# Patient Record
Sex: FEMALE | Race: WHITE | NOT HISPANIC OR LATINO | Employment: OTHER | URBAN - METROPOLITAN AREA
[De-identification: names, ages, dates, MRNs, and addresses within clinical notes are randomized per-mention and may not be internally consistent; named-entity substitution may affect disease eponyms.]

---

## 2017-01-12 ENCOUNTER — OFFICE VISIT - HEALTHEAST (OUTPATIENT)
Dept: FAMILY MEDICINE | Facility: CLINIC | Age: 62
End: 2017-01-12

## 2017-01-12 ENCOUNTER — RECORDS - HEALTHEAST (OUTPATIENT)
Dept: GENERAL RADIOLOGY | Facility: CLINIC | Age: 62
End: 2017-01-12

## 2017-01-12 DIAGNOSIS — R05.9 COUGH: ICD-10-CM

## 2017-01-12 DIAGNOSIS — H00.019 STYE: ICD-10-CM

## 2017-01-12 ASSESSMENT — MIFFLIN-ST. JEOR: SCORE: 1191.32

## 2017-01-23 ENCOUNTER — AMBULATORY - HEALTHEAST (OUTPATIENT)
Dept: FAMILY MEDICINE | Facility: CLINIC | Age: 62
End: 2017-01-23

## 2017-01-23 ENCOUNTER — COMMUNICATION - HEALTHEAST (OUTPATIENT)
Dept: FAMILY MEDICINE | Facility: CLINIC | Age: 62
End: 2017-01-23

## 2017-01-23 DIAGNOSIS — M81.0 OSTEOPOROSIS: ICD-10-CM

## 2017-01-23 DIAGNOSIS — Z12.39 SCREENING FOR BREAST CANCER: ICD-10-CM

## 2017-01-23 DIAGNOSIS — Z78.0 MENOPAUSE: ICD-10-CM

## 2017-01-23 DIAGNOSIS — Z00.00 HEALTH CARE MAINTENANCE: ICD-10-CM

## 2017-01-30 ENCOUNTER — AMBULATORY - HEALTHEAST (OUTPATIENT)
Dept: FAMILY MEDICINE | Facility: CLINIC | Age: 62
End: 2017-01-30

## 2017-01-30 ENCOUNTER — COMMUNICATION - HEALTHEAST (OUTPATIENT)
Dept: FAMILY MEDICINE | Facility: CLINIC | Age: 62
End: 2017-01-30

## 2017-02-06 ENCOUNTER — OFFICE VISIT - HEALTHEAST (OUTPATIENT)
Dept: FAMILY MEDICINE | Facility: CLINIC | Age: 62
End: 2017-02-06

## 2017-02-06 ENCOUNTER — RECORDS - HEALTHEAST (OUTPATIENT)
Dept: GENERAL RADIOLOGY | Facility: CLINIC | Age: 62
End: 2017-02-06

## 2017-02-06 DIAGNOSIS — R07.81 PLEURODYNIA: ICD-10-CM

## 2017-02-06 DIAGNOSIS — R07.81 RIB PAIN ON LEFT SIDE: ICD-10-CM

## 2017-02-06 ASSESSMENT — MIFFLIN-ST. JEOR: SCORE: 1195.86

## 2017-02-15 ENCOUNTER — RECORDS - HEALTHEAST (OUTPATIENT)
Dept: ADMINISTRATIVE | Facility: OTHER | Age: 62
End: 2017-02-15

## 2017-03-01 ENCOUNTER — RECORDS - HEALTHEAST (OUTPATIENT)
Dept: ADMINISTRATIVE | Facility: OTHER | Age: 62
End: 2017-03-01

## 2017-03-07 ENCOUNTER — RECORDS - HEALTHEAST (OUTPATIENT)
Dept: BONE DENSITY | Facility: CLINIC | Age: 62
End: 2017-03-07

## 2017-03-07 ENCOUNTER — RECORDS - HEALTHEAST (OUTPATIENT)
Dept: MAMMOGRAPHY | Facility: CLINIC | Age: 62
End: 2017-03-07

## 2017-03-07 DIAGNOSIS — Z78.0 ASYMPTOMATIC MENOPAUSAL STATE: ICD-10-CM

## 2017-03-07 DIAGNOSIS — Z00.00 ENCOUNTER FOR GENERAL ADULT MEDICAL EXAMINATION WITHOUT ABNORMAL FINDINGS: ICD-10-CM

## 2017-03-15 ENCOUNTER — AMBULATORY - HEALTHEAST (OUTPATIENT)
Dept: FAMILY MEDICINE | Facility: CLINIC | Age: 62
End: 2017-03-15

## 2017-04-24 ENCOUNTER — RECORDS - HEALTHEAST (OUTPATIENT)
Dept: GENERAL RADIOLOGY | Facility: CLINIC | Age: 62
End: 2017-04-24

## 2017-04-24 ENCOUNTER — OFFICE VISIT - HEALTHEAST (OUTPATIENT)
Dept: FAMILY MEDICINE | Facility: CLINIC | Age: 62
End: 2017-04-24

## 2017-04-24 ENCOUNTER — AMBULATORY - HEALTHEAST (OUTPATIENT)
Dept: FAMILY MEDICINE | Facility: CLINIC | Age: 62
End: 2017-04-24

## 2017-04-24 DIAGNOSIS — Z13.220 SCREENING FOR HYPERLIPIDEMIA: ICD-10-CM

## 2017-04-24 DIAGNOSIS — Z11.59 NEED FOR HEPATITIS C SCREENING TEST: ICD-10-CM

## 2017-04-24 DIAGNOSIS — Z00.00 ROUTINE GENERAL MEDICAL EXAMINATION AT A HEALTH CARE FACILITY: ICD-10-CM

## 2017-04-24 DIAGNOSIS — Z13.228 SCREENING FOR METABOLIC DISORDER: ICD-10-CM

## 2017-04-24 DIAGNOSIS — M43.6 NECK STIFFNESS: ICD-10-CM

## 2017-04-24 DIAGNOSIS — M43.6 TORTICOLLIS: ICD-10-CM

## 2017-04-24 DIAGNOSIS — Z12.72 SCREENING FOR VAGINAL CANCER: ICD-10-CM

## 2017-04-24 DIAGNOSIS — Z13.0 SCREENING FOR DEFICIENCY ANEMIA: ICD-10-CM

## 2017-04-24 LAB
CHOLEST SERPL-MCNC: 218 MG/DL
FASTING STATUS PATIENT QL REPORTED: YES
HDLC SERPL-MCNC: 70 MG/DL
LDLC SERPL CALC-MCNC: 132 MG/DL
TRIGL SERPL-MCNC: 81 MG/DL

## 2017-04-24 RX ORDER — INDAPAMIDE 1.25 MG
TABLET ORAL
Refills: 11 | Status: SHIPPED | COMMUNITY
Start: 2017-02-23

## 2017-04-24 ASSESSMENT — MIFFLIN-ST. JEOR: SCORE: 1173.18

## 2017-04-25 LAB — HCV AB SERPL QL IA: NEGATIVE

## 2017-04-26 ENCOUNTER — OFFICE VISIT - HEALTHEAST (OUTPATIENT)
Dept: PHYSICAL THERAPY | Facility: REHABILITATION | Age: 62
End: 2017-04-26

## 2017-04-26 DIAGNOSIS — G89.29 CHRONIC NECK PAIN: ICD-10-CM

## 2017-04-26 DIAGNOSIS — M54.2 CHRONIC NECK PAIN: ICD-10-CM

## 2017-04-26 DIAGNOSIS — R29.898 DECREASED ROM OF NECK: ICD-10-CM

## 2017-04-26 DIAGNOSIS — R29.3 ABNORMAL POSTURE: ICD-10-CM

## 2017-04-28 ENCOUNTER — AMBULATORY - HEALTHEAST (OUTPATIENT)
Dept: FAMILY MEDICINE | Facility: CLINIC | Age: 62
End: 2017-04-28

## 2017-04-28 ENCOUNTER — COMMUNICATION - HEALTHEAST (OUTPATIENT)
Dept: FAMILY MEDICINE | Facility: CLINIC | Age: 62
End: 2017-04-28

## 2017-04-28 LAB
BKR LAB AP ABNORMAL BLEEDING: NO
BKR LAB AP BIRTH CONTROL/HORMONES: NORMAL
BKR LAB AP CERVICAL APPEARANCE: NORMAL
BKR LAB AP GYN ADEQUACY: NORMAL
BKR LAB AP GYN INTERPRETATION: NORMAL
BKR LAB AP GYN OTHER FINDINGS: NORMAL
BKR LAB AP HPV REFLEX: NORMAL
BKR LAB AP LMP: NORMAL
BKR LAB AP PATIENT STATUS: NORMAL
BKR LAB AP PREVIOUS ABNORMAL: NORMAL
BKR LAB AP PREVIOUS NORMAL: 2014
HIGH RISK?: NO
HPV INTERPRETATION - HISTORICAL: NORMAL
HPV INTERPRETER - HISTORICAL: NORMAL
PATH REPORT.COMMENTS IMP SPEC: NORMAL
RESULT FLAG (HE HISTORICAL CONVERSION): NORMAL

## 2017-05-01 ENCOUNTER — COMMUNICATION - HEALTHEAST (OUTPATIENT)
Dept: FAMILY MEDICINE | Facility: CLINIC | Age: 62
End: 2017-05-01

## 2017-05-01 ENCOUNTER — AMBULATORY - HEALTHEAST (OUTPATIENT)
Dept: NURSING | Facility: CLINIC | Age: 62
End: 2017-05-01

## 2017-05-03 ENCOUNTER — OFFICE VISIT - HEALTHEAST (OUTPATIENT)
Dept: PHYSICAL THERAPY | Facility: REHABILITATION | Age: 62
End: 2017-05-03

## 2017-05-03 ENCOUNTER — OFFICE VISIT - HEALTHEAST (OUTPATIENT)
Dept: FAMILY MEDICINE | Facility: CLINIC | Age: 62
End: 2017-05-03

## 2017-05-03 DIAGNOSIS — G89.29 CHRONIC NECK PAIN: ICD-10-CM

## 2017-05-03 DIAGNOSIS — R29.3 ABNORMAL POSTURE: ICD-10-CM

## 2017-05-03 DIAGNOSIS — R29.898 DECREASED ROM OF NECK: ICD-10-CM

## 2017-05-03 DIAGNOSIS — Z12.4 CERVICAL CANCER SCREENING: ICD-10-CM

## 2017-05-03 DIAGNOSIS — M54.2 CHRONIC NECK PAIN: ICD-10-CM

## 2017-05-08 LAB
BKR LAB AP ABNORMAL BLEEDING: NO
BKR LAB AP BIRTH CONTROL/HORMONES: NORMAL
BKR LAB AP CERVICAL APPEARANCE: NORMAL
BKR LAB AP GYN ADEQUACY: NORMAL
BKR LAB AP GYN INTERPRETATION: NORMAL
BKR LAB AP HPV REFLEX: NORMAL
BKR LAB AP LMP: NORMAL
BKR LAB AP PATIENT STATUS: NORMAL
BKR LAB AP PREVIOUS ABNORMAL: NO
BKR LAB AP PREVIOUS NORMAL: 2013
HIGH RISK?: NO
HPV INTERPRETATION - HISTORICAL: NORMAL
HPV INTERPRETER - HISTORICAL: NORMAL
PATH REPORT.COMMENTS IMP SPEC: NORMAL
RESULT FLAG (HE HISTORICAL CONVERSION): NORMAL

## 2017-05-10 ENCOUNTER — OFFICE VISIT - HEALTHEAST (OUTPATIENT)
Dept: PHYSICAL THERAPY | Facility: REHABILITATION | Age: 62
End: 2017-05-10

## 2017-05-10 DIAGNOSIS — R29.3 ABNORMAL POSTURE: ICD-10-CM

## 2017-05-10 DIAGNOSIS — M54.2 CHRONIC NECK PAIN: ICD-10-CM

## 2017-05-10 DIAGNOSIS — R29.898 DECREASED ROM OF NECK: ICD-10-CM

## 2017-05-10 DIAGNOSIS — G89.29 CHRONIC NECK PAIN: ICD-10-CM

## 2017-07-05 ENCOUNTER — COMMUNICATION - HEALTHEAST (OUTPATIENT)
Dept: FAMILY MEDICINE | Facility: CLINIC | Age: 62
End: 2017-07-05

## 2017-07-07 ENCOUNTER — HOSPITAL ENCOUNTER (OUTPATIENT)
Dept: ULTRASOUND IMAGING | Facility: HOSPITAL | Age: 62
Discharge: HOME | End: 2017-07-07
Attending: FAMILY MEDICINE

## 2017-07-07 ENCOUNTER — OFFICE VISIT - HEALTHEAST (OUTPATIENT)
Dept: FAMILY MEDICINE | Facility: CLINIC | Age: 62
End: 2017-07-07

## 2017-07-07 DIAGNOSIS — I80.02 SUPERFICIAL PHLEBITIS OF LEFT LEG: ICD-10-CM

## 2017-07-07 ASSESSMENT — MIFFLIN-ST. JEOR: SCORE: 1159.57

## 2017-10-04 ENCOUNTER — AMBULATORY - HEALTHEAST (OUTPATIENT)
Dept: NURSING | Facility: CLINIC | Age: 62
End: 2017-10-04

## 2017-10-04 DIAGNOSIS — Z23 FLU VACCINE NEED: ICD-10-CM

## 2017-12-31 ENCOUNTER — HEALTH MAINTENANCE LETTER (OUTPATIENT)
Age: 62
End: 2017-12-31

## 2018-03-09 ENCOUNTER — RECORDS - HEALTHEAST (OUTPATIENT)
Dept: MAMMOGRAPHY | Facility: CLINIC | Age: 63
End: 2018-03-09

## 2018-03-09 DIAGNOSIS — Z12.31 ENCOUNTER FOR SCREENING MAMMOGRAM FOR MALIGNANT NEOPLASM OF BREAST: ICD-10-CM

## 2018-05-30 ENCOUNTER — OFFICE VISIT - HEALTHEAST (OUTPATIENT)
Dept: FAMILY MEDICINE | Facility: CLINIC | Age: 63
End: 2018-05-30

## 2018-05-30 DIAGNOSIS — Z80.41 FAMILY HISTORY OF OVARIAN CANCER: ICD-10-CM

## 2018-05-30 DIAGNOSIS — Z13.1 SCREENING FOR DIABETES MELLITUS: ICD-10-CM

## 2018-05-30 DIAGNOSIS — Z13.0 SCREENING FOR DEFICIENCY ANEMIA: ICD-10-CM

## 2018-05-30 DIAGNOSIS — Z00.00 ROUTINE GENERAL MEDICAL EXAMINATION AT A HEALTH CARE FACILITY: ICD-10-CM

## 2018-05-30 DIAGNOSIS — I83.93 VARICOSE VEINS OF BOTH LOWER EXTREMITIES: ICD-10-CM

## 2018-05-30 DIAGNOSIS — Z85.828 HISTORY OF BASAL CELL CANCER: ICD-10-CM

## 2018-05-30 DIAGNOSIS — M85.80 OSTEOPENIA: ICD-10-CM

## 2018-05-30 DIAGNOSIS — Z13.220 SCREENING FOR CHOLESTEROL LEVEL: ICD-10-CM

## 2018-05-30 DIAGNOSIS — Z13.29 SCREENING FOR THYROID DISORDER: ICD-10-CM

## 2018-05-30 LAB
CHOLEST SERPL-MCNC: 224 MG/DL
FASTING STATUS PATIENT QL REPORTED: YES
FASTING STATUS PATIENT QL REPORTED: YES
GLUCOSE BLD-MCNC: 86 MG/DL (ref 70–99)
HDLC SERPL-MCNC: 80 MG/DL
HGB BLD-MCNC: 13.2 G/DL (ref 12–16)
LDLC SERPL CALC-MCNC: 125 MG/DL
TRIGL SERPL-MCNC: 93 MG/DL
TSH SERPL DL<=0.005 MIU/L-ACNC: 1.71 UIU/ML (ref 0.3–5)

## 2018-05-30 ASSESSMENT — MIFFLIN-ST. JEOR: SCORE: 1110.81

## 2018-05-31 LAB
25(OH)D3 SERPL-MCNC: 52.7 NG/ML (ref 30–80)
25(OH)D3 SERPL-MCNC: 52.7 NG/ML (ref 30–80)

## 2018-09-26 ENCOUNTER — COMMUNICATION - HEALTHEAST (OUTPATIENT)
Dept: FAMILY MEDICINE | Facility: CLINIC | Age: 63
End: 2018-09-26

## 2018-09-28 ENCOUNTER — RECORDS - HEALTHEAST (OUTPATIENT)
Dept: GENERAL RADIOLOGY | Facility: CLINIC | Age: 63
End: 2018-09-28

## 2018-09-28 ENCOUNTER — OFFICE VISIT - HEALTHEAST (OUTPATIENT)
Dept: FAMILY MEDICINE | Facility: CLINIC | Age: 63
End: 2018-09-28

## 2018-09-28 DIAGNOSIS — M79.604 PAIN IN RIGHT LEG: ICD-10-CM

## 2018-09-28 DIAGNOSIS — M79.604 RIGHT LEG PAIN: ICD-10-CM

## 2018-10-01 ENCOUNTER — OFFICE VISIT - HEALTHEAST (OUTPATIENT)
Dept: PHYSICAL THERAPY | Facility: REHABILITATION | Age: 63
End: 2018-10-01

## 2018-10-01 DIAGNOSIS — M79.604 RIGHT LEG PAIN: ICD-10-CM

## 2018-10-01 DIAGNOSIS — M85.80 OSTEOPENIA: ICD-10-CM

## 2018-10-01 DIAGNOSIS — M54.41 RIGHT-SIDED LOW BACK PAIN WITH RIGHT-SIDED SCIATICA, UNSPECIFIED CHRONICITY: ICD-10-CM

## 2018-10-04 ENCOUNTER — OFFICE VISIT - HEALTHEAST (OUTPATIENT)
Dept: PHYSICAL THERAPY | Facility: REHABILITATION | Age: 63
End: 2018-10-04

## 2018-10-04 DIAGNOSIS — M54.41 RIGHT-SIDED LOW BACK PAIN WITH RIGHT-SIDED SCIATICA, UNSPECIFIED CHRONICITY: ICD-10-CM

## 2018-10-04 DIAGNOSIS — R29.898 DECREASED ROM OF NECK: ICD-10-CM

## 2018-10-04 DIAGNOSIS — M54.2 CHRONIC NECK PAIN: ICD-10-CM

## 2018-10-04 DIAGNOSIS — R29.3 ABNORMAL POSTURE: ICD-10-CM

## 2018-10-04 DIAGNOSIS — M79.604 RIGHT LEG PAIN: ICD-10-CM

## 2018-10-04 DIAGNOSIS — G89.29 CHRONIC NECK PAIN: ICD-10-CM

## 2018-10-04 DIAGNOSIS — M85.80 OSTEOPENIA: ICD-10-CM

## 2018-10-09 ENCOUNTER — OFFICE VISIT - HEALTHEAST (OUTPATIENT)
Dept: PHYSICAL THERAPY | Facility: REHABILITATION | Age: 63
End: 2018-10-09

## 2018-10-09 DIAGNOSIS — M85.80 OSTEOPENIA: ICD-10-CM

## 2018-10-09 DIAGNOSIS — M54.41 RIGHT-SIDED LOW BACK PAIN WITH RIGHT-SIDED SCIATICA, UNSPECIFIED CHRONICITY: ICD-10-CM

## 2018-10-09 DIAGNOSIS — M79.604 RIGHT LEG PAIN: ICD-10-CM

## 2018-10-12 ENCOUNTER — OFFICE VISIT - HEALTHEAST (OUTPATIENT)
Dept: PHYSICAL THERAPY | Facility: REHABILITATION | Age: 63
End: 2018-10-12

## 2018-10-12 DIAGNOSIS — M54.41 RIGHT-SIDED LOW BACK PAIN WITH RIGHT-SIDED SCIATICA, UNSPECIFIED CHRONICITY: ICD-10-CM

## 2018-10-12 DIAGNOSIS — M85.80 OSTEOPENIA: ICD-10-CM

## 2018-10-12 DIAGNOSIS — M79.604 RIGHT LEG PAIN: ICD-10-CM

## 2018-10-17 ENCOUNTER — OFFICE VISIT - HEALTHEAST (OUTPATIENT)
Dept: PHYSICAL THERAPY | Facility: REHABILITATION | Age: 63
End: 2018-10-17

## 2018-10-17 DIAGNOSIS — M85.80 OSTEOPENIA: ICD-10-CM

## 2018-10-17 DIAGNOSIS — M79.604 RIGHT LEG PAIN: ICD-10-CM

## 2018-10-17 DIAGNOSIS — M54.41 RIGHT-SIDED LOW BACK PAIN WITH RIGHT-SIDED SCIATICA, UNSPECIFIED CHRONICITY: ICD-10-CM

## 2018-10-17 DIAGNOSIS — G89.29 CHRONIC NECK PAIN: ICD-10-CM

## 2018-10-17 DIAGNOSIS — R29.3 ABNORMAL POSTURE: ICD-10-CM

## 2018-10-17 DIAGNOSIS — M54.2 CHRONIC NECK PAIN: ICD-10-CM

## 2018-10-17 DIAGNOSIS — R29.898 DECREASED ROM OF NECK: ICD-10-CM

## 2018-10-22 ENCOUNTER — OFFICE VISIT - HEALTHEAST (OUTPATIENT)
Dept: PHYSICAL THERAPY | Facility: REHABILITATION | Age: 63
End: 2018-10-22

## 2018-10-22 DIAGNOSIS — M79.604 RIGHT LEG PAIN: ICD-10-CM

## 2018-10-22 DIAGNOSIS — M54.41 RIGHT-SIDED LOW BACK PAIN WITH RIGHT-SIDED SCIATICA, UNSPECIFIED CHRONICITY: ICD-10-CM

## 2018-10-22 DIAGNOSIS — M85.80 OSTEOPENIA: ICD-10-CM

## 2018-10-29 ENCOUNTER — OFFICE VISIT - HEALTHEAST (OUTPATIENT)
Dept: PHYSICAL THERAPY | Facility: REHABILITATION | Age: 63
End: 2018-10-29

## 2018-10-29 DIAGNOSIS — M79.604 RIGHT LEG PAIN: ICD-10-CM

## 2018-10-29 DIAGNOSIS — M85.80 OSTEOPENIA: ICD-10-CM

## 2018-10-29 DIAGNOSIS — M54.41 RIGHT-SIDED LOW BACK PAIN WITH RIGHT-SIDED SCIATICA, UNSPECIFIED CHRONICITY: ICD-10-CM

## 2019-01-08 ENCOUNTER — AMBULATORY - HEALTHEAST (OUTPATIENT)
Dept: NURSING | Facility: CLINIC | Age: 64
End: 2019-01-08

## 2019-01-08 DIAGNOSIS — Z00.00 ROUTINE GENERAL MEDICAL EXAMINATION AT A HEALTH CARE FACILITY: ICD-10-CM

## 2019-02-18 ENCOUNTER — AMBULATORY - HEALTHEAST (OUTPATIENT)
Dept: NURSING | Facility: CLINIC | Age: 64
End: 2019-02-18

## 2019-03-13 ENCOUNTER — RECORDS - HEALTHEAST (OUTPATIENT)
Dept: ADMINISTRATIVE | Facility: OTHER | Age: 64
End: 2019-03-13

## 2019-03-21 ENCOUNTER — RECORDS - HEALTHEAST (OUTPATIENT)
Dept: ADMINISTRATIVE | Facility: OTHER | Age: 64
End: 2019-03-21

## 2019-04-02 ENCOUNTER — COMMUNICATION - HEALTHEAST (OUTPATIENT)
Dept: FAMILY MEDICINE | Facility: CLINIC | Age: 64
End: 2019-04-02

## 2019-04-02 DIAGNOSIS — M85.89 OSTEOPENIA OF MULTIPLE SITES: ICD-10-CM

## 2019-04-16 ENCOUNTER — RECORDS - HEALTHEAST (OUTPATIENT)
Dept: BONE DENSITY | Facility: CLINIC | Age: 64
End: 2019-04-16

## 2019-04-16 ENCOUNTER — RECORDS - HEALTHEAST (OUTPATIENT)
Dept: ADMINISTRATIVE | Facility: OTHER | Age: 64
End: 2019-04-16

## 2019-04-16 ENCOUNTER — RECORDS - HEALTHEAST (OUTPATIENT)
Dept: MAMMOGRAPHY | Facility: CLINIC | Age: 64
End: 2019-04-16

## 2019-04-16 DIAGNOSIS — Z12.31 ENCOUNTER FOR SCREENING MAMMOGRAM FOR MALIGNANT NEOPLASM OF BREAST: ICD-10-CM

## 2019-04-16 DIAGNOSIS — M85.89 OTHER SPECIFIED DISORDERS OF BONE DENSITY AND STRUCTURE, MULTIPLE SITES: ICD-10-CM

## 2019-05-08 ENCOUNTER — AMBULATORY - HEALTHEAST (OUTPATIENT)
Dept: NURSING | Facility: CLINIC | Age: 64
End: 2019-05-08

## 2019-05-18 ENCOUNTER — COMMUNICATION - HEALTHEAST (OUTPATIENT)
Dept: FAMILY MEDICINE | Facility: CLINIC | Age: 64
End: 2019-05-18

## 2019-05-18 DIAGNOSIS — Z12.11 SCREENING FOR COLON CANCER: ICD-10-CM

## 2019-05-28 ENCOUNTER — RECORDS - HEALTHEAST (OUTPATIENT)
Dept: ADMINISTRATIVE | Facility: OTHER | Age: 64
End: 2019-05-28

## 2019-06-03 ENCOUNTER — OFFICE VISIT - HEALTHEAST (OUTPATIENT)
Dept: FAMILY MEDICINE | Facility: CLINIC | Age: 64
End: 2019-06-03

## 2019-06-03 DIAGNOSIS — Z13.0 SCREENING FOR DEFICIENCY ANEMIA: ICD-10-CM

## 2019-06-03 DIAGNOSIS — Z85.828 HISTORY OF SKIN CANCER: ICD-10-CM

## 2019-06-03 DIAGNOSIS — Z13.29 SCREENING FOR THYROID DISORDER: ICD-10-CM

## 2019-06-03 DIAGNOSIS — Z00.00 ROUTINE GENERAL MEDICAL EXAMINATION AT A HEALTH CARE FACILITY: ICD-10-CM

## 2019-06-03 DIAGNOSIS — Z13.21 ENCOUNTER FOR VITAMIN DEFICIENCY SCREENING: ICD-10-CM

## 2019-06-03 DIAGNOSIS — M81.0 AGE-RELATED OSTEOPOROSIS WITHOUT CURRENT PATHOLOGICAL FRACTURE: ICD-10-CM

## 2019-06-03 DIAGNOSIS — Z13.220 SCREENING FOR CHOLESTEROL LEVEL: ICD-10-CM

## 2019-06-03 DIAGNOSIS — Z13.1 SCREENING FOR DIABETES MELLITUS: ICD-10-CM

## 2019-06-03 LAB
ANION GAP SERPL CALCULATED.3IONS-SCNC: 9 MMOL/L (ref 5–18)
BUN SERPL-MCNC: 11 MG/DL (ref 8–22)
CALCIUM SERPL-MCNC: 10.2 MG/DL (ref 8.5–10.5)
CHLORIDE BLD-SCNC: 104 MMOL/L (ref 98–107)
CHOLEST SERPL-MCNC: 227 MG/DL
CO2 SERPL-SCNC: 27 MMOL/L (ref 22–31)
CREAT SERPL-MCNC: 0.73 MG/DL (ref 0.6–1.1)
ERYTHROCYTE [DISTWIDTH] IN BLOOD BY AUTOMATED COUNT: 11.8 % (ref 11–14.5)
FASTING STATUS PATIENT QL REPORTED: YES
GFR SERPL CREATININE-BSD FRML MDRD: >60 ML/MIN/1.73M2
GLUCOSE BLD-MCNC: 91 MG/DL (ref 70–125)
HCT VFR BLD AUTO: 42.3 % (ref 35–47)
HDLC SERPL-MCNC: 77 MG/DL
HGB BLD-MCNC: 14.1 G/DL (ref 12–16)
LDLC SERPL CALC-MCNC: 136 MG/DL
MCH RBC QN AUTO: 29.6 PG (ref 27–34)
MCHC RBC AUTO-ENTMCNC: 33.3 G/DL (ref 32–36)
MCV RBC AUTO: 89 FL (ref 80–100)
PLATELET # BLD AUTO: 237 THOU/UL (ref 140–440)
PMV BLD AUTO: 8.8 FL (ref 7–10)
POTASSIUM BLD-SCNC: 5.2 MMOL/L (ref 3.5–5)
RBC # BLD AUTO: 4.76 MILL/UL (ref 3.8–5.4)
SODIUM SERPL-SCNC: 140 MMOL/L (ref 136–145)
TRIGL SERPL-MCNC: 69 MG/DL
TSH SERPL DL<=0.005 MIU/L-ACNC: 2.43 UIU/ML (ref 0.3–5)
WBC: 4.9 THOU/UL (ref 4–11)

## 2019-06-03 ASSESSMENT — MIFFLIN-ST. JEOR: SCORE: 1130.09

## 2019-06-04 LAB
25(OH)D3 SERPL-MCNC: 49.5 NG/ML (ref 30–80)
25(OH)D3 SERPL-MCNC: 49.5 NG/ML (ref 30–80)

## 2019-06-11 ENCOUNTER — RECORDS - HEALTHEAST (OUTPATIENT)
Dept: ADMINISTRATIVE | Facility: OTHER | Age: 64
End: 2019-06-11

## 2019-06-17 ENCOUNTER — COMMUNICATION - HEALTHEAST (OUTPATIENT)
Dept: LAB | Facility: CLINIC | Age: 64
End: 2019-06-17

## 2019-06-17 DIAGNOSIS — E87.5 HYPERKALEMIA: ICD-10-CM

## 2019-06-18 ENCOUNTER — AMBULATORY - HEALTHEAST (OUTPATIENT)
Dept: LAB | Facility: CLINIC | Age: 64
End: 2019-06-18

## 2019-06-18 DIAGNOSIS — E87.5 HYPERKALEMIA: ICD-10-CM

## 2019-06-18 LAB — POTASSIUM BLD-SCNC: 4.8 MMOL/L (ref 3.5–5)

## 2019-10-14 ENCOUNTER — AMBULATORY - HEALTHEAST (OUTPATIENT)
Dept: NURSING | Facility: CLINIC | Age: 64
End: 2019-10-14

## 2019-10-14 DIAGNOSIS — Z23 FLU VACCINE NEED: ICD-10-CM

## 2020-07-01 ENCOUNTER — RECORDS - HEALTHEAST (OUTPATIENT)
Dept: ADMINISTRATIVE | Facility: OTHER | Age: 65
End: 2020-07-01

## 2020-10-01 ENCOUNTER — AMBULATORY - HEALTHEAST (OUTPATIENT)
Dept: FAMILY MEDICINE | Facility: CLINIC | Age: 65
End: 2020-10-01

## 2020-10-01 ENCOUNTER — OFFICE VISIT - HEALTHEAST (OUTPATIENT)
Dept: FAMILY MEDICINE | Facility: CLINIC | Age: 65
End: 2020-10-01

## 2020-10-01 DIAGNOSIS — Z13.0 SCREENING FOR DEFICIENCY ANEMIA: ICD-10-CM

## 2020-10-01 DIAGNOSIS — Z23 ENCOUNTER FOR IMMUNIZATION: ICD-10-CM

## 2020-10-01 DIAGNOSIS — Z00.00 ROUTINE GENERAL MEDICAL EXAMINATION AT A HEALTH CARE FACILITY: ICD-10-CM

## 2020-10-01 DIAGNOSIS — Z13.21 ENCOUNTER FOR VITAMIN DEFICIENCY SCREENING: ICD-10-CM

## 2020-10-01 DIAGNOSIS — Z12.31 VISIT FOR SCREENING MAMMOGRAM: ICD-10-CM

## 2020-10-01 DIAGNOSIS — S16.1XXA STRAIN OF CERVICAL PORTION OF LEFT TRAPEZIUS MUSCLE: ICD-10-CM

## 2020-10-01 DIAGNOSIS — Z13.220 SCREENING FOR CHOLESTEROL LEVEL: ICD-10-CM

## 2020-10-01 DIAGNOSIS — Z13.1 SCREENING FOR DIABETES MELLITUS: ICD-10-CM

## 2020-10-01 DIAGNOSIS — M81.0 AGE-RELATED OSTEOPOROSIS WITHOUT CURRENT PATHOLOGICAL FRACTURE: ICD-10-CM

## 2020-10-01 LAB
ANION GAP SERPL CALCULATED.3IONS-SCNC: 13 MMOL/L (ref 5–18)
BUN SERPL-MCNC: 11 MG/DL (ref 8–22)
CALCIUM SERPL-MCNC: 9.6 MG/DL (ref 8.5–10.5)
CHLORIDE BLD-SCNC: 101 MMOL/L (ref 98–107)
CHOLEST SERPL-MCNC: 219 MG/DL
CO2 SERPL-SCNC: 26 MMOL/L (ref 22–31)
CREAT SERPL-MCNC: 0.74 MG/DL (ref 0.6–1.1)
FASTING STATUS PATIENT QL REPORTED: YES
GFR SERPL CREATININE-BSD FRML MDRD: >60 ML/MIN/1.73M2
GLUCOSE BLD-MCNC: 86 MG/DL (ref 70–125)
HDLC SERPL-MCNC: 75 MG/DL
HGB BLD-MCNC: 13.3 G/DL (ref 12–16)
LDLC SERPL CALC-MCNC: 129 MG/DL
POTASSIUM BLD-SCNC: 4.6 MMOL/L (ref 3.5–5)
SODIUM SERPL-SCNC: 140 MMOL/L (ref 136–145)
TRIGL SERPL-MCNC: 77 MG/DL

## 2020-10-01 ASSESSMENT — MIFFLIN-ST. JEOR: SCORE: 1147.1

## 2020-10-02 LAB
25(OH)D3 SERPL-MCNC: 48.7 NG/ML (ref 30–80)
25(OH)D3 SERPL-MCNC: 48.7 NG/ML (ref 30–80)

## 2020-11-04 ENCOUNTER — COMMUNICATION - HEALTHEAST (OUTPATIENT)
Dept: FAMILY MEDICINE | Facility: CLINIC | Age: 65
End: 2020-11-04

## 2020-11-05 ENCOUNTER — HOSPITAL ENCOUNTER (OUTPATIENT)
Dept: MAMMOGRAPHY | Facility: CLINIC | Age: 65
Discharge: HOME | End: 2020-11-05
Attending: NURSE PRACTITIONER

## 2020-11-05 DIAGNOSIS — Z12.31 VISIT FOR SCREENING MAMMOGRAM: ICD-10-CM

## 2020-11-16 ENCOUNTER — COMMUNICATION - HEALTHEAST (OUTPATIENT)
Dept: FAMILY MEDICINE | Facility: CLINIC | Age: 65
End: 2020-11-16

## 2020-11-16 DIAGNOSIS — M81.0 AGE-RELATED OSTEOPOROSIS WITHOUT CURRENT PATHOLOGICAL FRACTURE: ICD-10-CM

## 2021-01-12 ENCOUNTER — OFFICE VISIT - HEALTHEAST (OUTPATIENT)
Dept: FAMILY MEDICINE | Facility: CLINIC | Age: 66
End: 2021-01-12

## 2021-01-12 DIAGNOSIS — I82.812 ACUTE SUPERFICIAL VENOUS THROMBOSIS OF LOWER EXTREMITY, LEFT: ICD-10-CM

## 2021-01-20 ENCOUNTER — RECORDS - HEALTHEAST (OUTPATIENT)
Dept: BONE DENSITY | Facility: CLINIC | Age: 66
End: 2021-01-20

## 2021-01-20 ENCOUNTER — RECORDS - HEALTHEAST (OUTPATIENT)
Dept: ADMINISTRATIVE | Facility: OTHER | Age: 66
End: 2021-01-20

## 2021-01-20 DIAGNOSIS — M81.0 AGE-RELATED OSTEOPOROSIS WITHOUT CURRENT PATHOLOGICAL FRACTURE: ICD-10-CM

## 2021-01-27 ENCOUNTER — COMMUNICATION - HEALTHEAST (OUTPATIENT)
Dept: FAMILY MEDICINE | Facility: CLINIC | Age: 66
End: 2021-01-27

## 2021-01-27 DIAGNOSIS — M81.0 AGE-RELATED OSTEOPOROSIS WITHOUT CURRENT PATHOLOGICAL FRACTURE: ICD-10-CM

## 2021-01-29 RX ORDER — ALENDRONATE SODIUM 70 MG/1
70 TABLET ORAL
Qty: 12 TABLET | Refills: 3 | Status: SHIPPED | OUTPATIENT
Start: 2021-01-29 | End: 2021-10-18

## 2021-03-09 ENCOUNTER — RECORDS - HEALTHEAST (OUTPATIENT)
Dept: ADMINISTRATIVE | Facility: OTHER | Age: 66
End: 2021-03-09

## 2021-05-28 ENCOUNTER — RECORDS - HEALTHEAST (OUTPATIENT)
Dept: ADMINISTRATIVE | Facility: CLINIC | Age: 66
End: 2021-05-28

## 2021-05-29 ENCOUNTER — RECORDS - HEALTHEAST (OUTPATIENT)
Dept: ADMINISTRATIVE | Facility: CLINIC | Age: 66
End: 2021-05-29

## 2021-05-29 NOTE — TELEPHONE ENCOUNTER
Hi Dr Edmond,  This patient is on lab schedule for tomorrow morning at 08 :15 AM and need some lab orders placed, please. Thank you.

## 2021-05-29 NOTE — PROGRESS NOTES
Assessment and Plan:   1. Routine general medical examination at a health care facility  Fasting labs, up to date on pap, mammography, colonoscopy, immunizations.  Plan for PPSV23 in one year. She has some mild neuropathy symptoms in her left lateral thigh intermittently. Likely pinched nerve, her strength and DTRs are normal on exam. Advised continued monitoring.     2. Age-related osteoporosis without current pathological fracture  Reviewed recent DEXA results as compared to previous and discussed current research to support drug holiday from bisphosphonates for 3-5 years.  She did have a significant decline in density of the AP spine of 6.1%.  Will plan to repeat DEXA in one year and if subsequent significant decline, will discuss restarting therapy. Continue with calcium, vitamin D and weight bearing exercise/strength training.   - Basic Metabolic Panel  - Vitamin D, Total (25-Hydroxy)    3. History of skin cancer  Follows with dermatology    4. Screening for cholesterol level  - Lipid Cascade    5. Screening for diabetes mellitus  - Basic Metabolic Panel    6. Screening for deficiency anemia  - HM2(CBC w/o Differential)    7. Encounter for vitamin deficiency screening  - Vitamin D, Total (25-Hydroxy)    8. Screening for thyroid disorder  - Thyroid Cascade    The patient's current medical problems were reviewed.    I have had an Advance Directives discussion with the patient.  The following health maintenance schedule was reviewed with the patient and provided in printed form in the after visit summary:   Health Maintenance   Topic Date Due     ADVANCE DIRECTIVES DISCUSSED WITH PATIENT  01/05/2012     PNEUMOCOCCAL POLYSACCHARIDE VACCINE AGE 65 AND OVER  03/31/2019     FALL RISK ASSESSMENT  03/31/2019     MAMMOGRAM  04/16/2021     DXA SCAN  04/16/2021     COLONOSCOPY  05/28/2024     TD 18+ HE  02/18/2029     INFLUENZA VACCINE RULE BASED  Completed     TDAP ADULT ONE TIME DOSE  Completed     PNEUMOCOCCAL  CONJUGATE VACCINE FOR ADULTS (PCV13 OR PREVNAR)  Completed     ZOSTER VACCINES  Completed        Subjective:   Chief Complaint: Sharyn Mcfarlane is an 64 y.o. female here for a Welcome to Medicare visit.   HPI:      H/o osteoporosis.  Treated with Fosamax x8 years and started drug holiday in 2017.  DEXA last month showed worsening bone mineral density with decrease of 6.1% in the AP spine and 3% in the right hip. T-score -2.7 in spine.   [facility] and [facility] training 5-6x/week.     Left thigh tingling intermittently.  Typically with sitting on cough or recliner. She has not noted any weakness.  Left side of back stiff at times though not particularly painful.  No h/o injury.     HM:  Mammography annually d/t [facility] ovarian cancer, up to date on this. Colonoscopy in May, normal screen this time but history of polyps, due in 2024. Pap last year with scant cellularity s/p hysterectomy, discussed discontinuing screening. DEXA as above.      Review of Systems:   Please see above.  The rest of the review of systems are negative for all systems.    Patient Care Team:  Vandana Warren CNP as PCP - General (Nurse Practitioner)     Patient Active Problem List   Diagnosis     Glaucoma     Rosacea     Osteopenia     History of basal cell cancer     Family history of ovarian cancer     Past Medical History:   Diagnosis Date     Carpal tunnel syndrome      Fibrocystic breast      Glaucoma       Past Surgical History:   Procedure Laterality Date     HYSTERECTOMY  1990     OOPHORECTOMY       MT LAP,DIAGNOSTIC ABDOMEN      Description: Laparoscopy (Diagnostic);  Recorded: 03/31/2011;     MT REMOVAL OF TONSILS,<13 Y/O      Description: Tonsillectomy;  Recorded: 01/16/2009;     MT TOTAL ABDOM HYSTERECTOMY      Description: Hysterectomy;  Recorded: 01/20/2010;  Comments: vaginal      Family History   Problem Relation Age of Onset     Ovarian cancer Mother 65     Cancer Mother          skin     Colon cancer Father 88     Cancer Father         parotid     Glaucoma Father      Heart disease Paternal Grandfather      Diabetes type II Maternal Uncle       Social History     Socioeconomic History     Marital status:      Spouse name: Not on file     Number of children: Not on file     Years of education: Not on file     Highest education level: Not on file   Occupational History     Not on file   Social Needs     Financial resource strain: Not on file     Food insecurity:     Worry: Not on file     Inability: Not on file     Transportation needs:     Medical: Not on file     Non-medical: Not on file   Tobacco Use     Smoking status: Former Smoker     Smokeless tobacco: Never Used   Substance and Sexual Activity     Alcohol use: Yes     Alcohol/week: 3.5 oz     Types: 7 Standard drinks or equivalent per week     Drug use: No     Sexual activity: Yes     Partners: Male     Birth control/protection: Post-menopausal   Lifestyle     Physical activity:     Days per week: Not on file     Minutes per session: Not on file     Stress: Not on file   Relationships     Social connections:     Talks on phone: Not on file     Gets together: Not on file     Attends Baptism service: Not on file     Active member of club or organization: Not on file     Attends meetings of clubs or organizations: Not on file     Relationship status: Not on file     Intimate partner violence:     Fear of current or ex partner: Not on file     Emotionally abused: Not on file     Physically abused: Not on file     Forced sexual activity: Not on file   Other Topics Concern     Not on file   Social History Narrative    ,  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        Current Outpatient Medications   Medication Sig Dispense Refill     calcium carbonate (OS-ARNIE) 600 mg (1,500 mg) tablet Take 600 mg by mouth 2 (two) times a day with meals.       latanoprost (XALATAN) 0.005 % ophthalmic solution        multivitamin  "capsule Take 1 capsule by mouth daily.       FINACEA 15 % gel   11     No current facility-administered medications for this visit.       Objective:   Vital Signs:   Visit Vitals  /68 (Patient Site: Right Arm, Patient Position: Sitting, Cuff Size: Adult Regular)   Pulse (!) 56   Ht 5' 4\" (1.626 m)   Wt 134 lb 8 oz (61 kg)   BMI 23.09 kg/m         EKG:  Not indicated    VisionScreening:   Visual Acuity Screening    Right eye Left eye Both eyes   Without correction: 20/25 20/20 20/20   With correction:           PHYSICAL EXAM  GENERAL: Alert, well appearing female  PSYCH: Pleasant mood, affect appropriate.   SKIN: No atypical lesions  EYES: Conjunctiva pink, sclera white, no exudates. OVIDIO.  EOMs intact.   EARS: TMs pearly grey, no bulging, redness, retraction. Hearing grossly normal.   MOUTH: Pharynx moist, pink without exudate. No tonsillar enlargement  NECK: No lymphadenopathy. Thyroid borders smooth without enlargement, nodules.   CV: Regular rate and rhythm without murmurs, rubs or gallops.  RESP: Lung sounds clear  ABDOMEN: BS+. Abdomen soft, nontender to palpation without guarding. No organomegaly, masses or hernias  PV : No edema  BREASTS: Breasts symmetric, no dimpling, masses or skin discolorations seen. Areolas and nipples symmetric without discharge. On palpation, breast tissue supple and nontender. No masses or nodules. Axillary and epitrochlear lymph nodes nonpalpable.   MSK:  LE strength 5/5 bilaterally  NEURO:  DTRs 2/4 patellar, post tibial      Assessment Results 6/3/2019   Activities of Daily Living No help needed   Instrumental Activities of Daily Living No help needed   Mini Cog Total Score 5   Some recent data might be hidden     A Mini Cog score of 0-2 suggests the possibility of dementia, score of 3-5 suggests no dementia    Identified Health Risks:     Information regarding advance directives (living [place]), including where she can download the appropriate form, was provided to the " patient via the AVS.

## 2021-05-30 VITALS — HEIGHT: 64 IN | BODY MASS INDEX: 25.27 KG/M2 | WEIGHT: 148 LBS

## 2021-05-30 VITALS — HEIGHT: 64 IN | BODY MASS INDEX: 24.59 KG/M2 | WEIGHT: 144 LBS

## 2021-05-30 VITALS — WEIGHT: 149 LBS | BODY MASS INDEX: 25.44 KG/M2 | HEIGHT: 64 IN

## 2021-05-31 VITALS — WEIGHT: 141 LBS | BODY MASS INDEX: 24.07 KG/M2 | HEIGHT: 64 IN

## 2021-06-01 VITALS — WEIGHT: 130.25 LBS | HEIGHT: 64 IN | BODY MASS INDEX: 22.24 KG/M2

## 2021-06-02 VITALS — BODY MASS INDEX: 23 KG/M2 | WEIGHT: 134 LBS

## 2021-06-03 VITALS — BODY MASS INDEX: 22.96 KG/M2 | WEIGHT: 134.5 LBS | HEIGHT: 64 IN

## 2021-06-05 VITALS
HEART RATE: 63 BPM | OXYGEN SATURATION: 97 % | BODY MASS INDEX: 24.26 KG/M2 | SYSTOLIC BLOOD PRESSURE: 126 MMHG | DIASTOLIC BLOOD PRESSURE: 78 MMHG | WEIGHT: 141.31 LBS

## 2021-06-05 VITALS
BODY MASS INDEX: 23.6 KG/M2 | TEMPERATURE: 98.3 F | WEIGHT: 138.25 LBS | DIASTOLIC BLOOD PRESSURE: 70 MMHG | SYSTOLIC BLOOD PRESSURE: 120 MMHG | RESPIRATION RATE: 16 BRPM | HEART RATE: 60 BPM | HEIGHT: 64 IN

## 2021-06-08 NOTE — PROGRESS NOTES
Assessment/Plan:        Diagnoses and all orders for this visit:    Rib pain on left side  -     XR Ribs Left W PA Chest; Future; Expected date: 2/6/17    Xray taken and read by me - this all appears normal withoiut any fx or dislocations   Would treat this as musculoskeletal pain - Advil or tylenol. Can try gentle heat to the area as well.         Subjective:    Patient ID: Sharyn Mcfarlane is a 61 y.o. female.    Abdominal Pain   This is a new problem. The current episode started in the past 7 days. The onset quality is gradual. The problem occurs intermittently. The problem has been unchanged. The pain is located in the LUQ and left flank. The pain is at a severity of 3/10. The pain is mild. The quality of the pain is aching, burning, cramping and sharp. The abdominal pain radiates to the back. Pertinent negatives include no belching, constipation, diarrhea, dysuria, fever, frequency, hematuria, nausea or vomiting. The pain is aggravated by certain positions, deep breathing and movement. The pain is relieved by being still. She has tried acetaminophen for the symptoms. The treatment provided no relief.       The following portions of the patient's history were reviewed and updated as appropriate: allergies, current medications, past family history, past medical history, past social history, past surgical history and problem list.    Review of Systems   Constitutional: Negative for activity change, appetite change, fatigue and fever.   Gastrointestinal: Positive for abdominal pain. Negative for abdominal distention, constipation, diarrhea, nausea and vomiting.   Genitourinary: Negative for dysuria, frequency and hematuria.   All other systems reviewed and are negative.            Objective:    Physical Exam   Constitutional: She appears well-developed and well-nourished. No distress.   Cardiovascular: Normal rate, regular rhythm and normal heart sounds.  Exam reveals no gallop and no friction rub.    No murmur  heard.  Pulmonary/Chest: Effort normal and breath sounds normal. She has no wheezes. She has no rales.   Abdominal: Soft. Bowel sounds are normal. She exhibits no distension and no mass. There is no guarding.   There is almost point tenderness over the L flank ribs. No bruising noted. No skin lesions noted. No CVA tenderness noted. The tenderness travels around the rib cage.    Skin: Skin is warm and dry. No rash noted.   Nursing note and vitals reviewed.

## 2021-06-08 NOTE — PROGRESS NOTES
Subjective   Chief Complaint:  Cough (since [place]. )    HPI:   Sharyn Mcfarlane is a 61 y.o. female who presents for evaluation of cough.   She is a patient of Dr. So.  PMH significant for glaucoma, rosacea.     She states she had a URI that began two and a half weeks ago and along with upper respiratory symptoms she felt tightness in her chest described as the need to work harder to take a deep breath.  Cough developed shortly after and has continued until this time.  Productive at times.  No fever, chills though feels markedly fatigued.  Difficulty getting the energy to do much.  Continues to feel she has to work harder to inhale.  No chest pain. Denies shortness of breath or wheezing.      Not a current smoker though former smoker 20 years ago      Left eye:  Lower lid with swelling and redness.  Initially improving with warm packs though then stopped using the and it has persisted.  Mildly tender.  No drainage.     PMH:   Patient Active Problem List   Diagnosis     Glaucoma     Rosacea     Osteoporosis       Past Medical History   Diagnosis Date     Carpal tunnel syndrome      Fibrocystic breast      Glaucoma        Current Medications:   Current Outpatient Prescriptions on File Prior to Visit   Medication Sig Dispense Refill     alendronate (FOSAMAX) 70 MG tablet Take 1 tablet (70 mg total) by mouth every 7 days. Take in the morning on an empty stomach with a full glass of water 30 minutes before food 12 tablet 3     calcium carbonate (OS-ARNIE) 600 mg (1,500 mg) tablet Take 600 mg by mouth 2 (two) times a day with meals.       GLUCOSAMINE SULFATE (GLUCOSAMINE ORAL) Take by mouth.       latanoprost (XALATAN) 0.005 % ophthalmic solution        multivitamin capsule Take 1 capsule by mouth daily.       [DISCONTINUED] mefloquine (LARIAM) 250 mg tablet Take 1 tab q week starting 1 week before, during and 4 weeks after 7 tablet 0     No current facility-administered medications on file prior to visit.   "      Allergies:  has No Known Allergies.    SH/FH:  Social History and Family History reviewed and updated.   Tobacco Status:  She  reports that she has quit smoking. She has never used smokeless tobacco.    Review of Systems:  A complete head to toe ROS is negative unless otherwise noted in HPI    Objective     Vitals:    01/12/17 1141   BP: 112/70   Pulse: 65   SpO2: 98%   Weight: 148 lb (67.1 kg)   Height: 5' 4\" (1.626 m)     Wt Readings from Last 3 Encounters:   01/12/17 148 lb (67.1 kg)   04/22/16 139 lb (63 kg)   11/20/15 143 lb (64.9 kg)       Physical Exam:  GENERAL: Alert, cooperative, appears fatigued  HEAD: Normocephalic, atraumatic  EYES: Left lower lid with small stye. Conjunctiva pink, sclera white, no exudates. OVIDIO.  EOMs intact. Corneal light reflex bilaterally, red reflex present.Undilated fundoscopic exam normal  EARS: TMs pearly grey, no bulging, redness, retraction. Hearing grossly normal.   NOSE: Nares patent, no discharge.    MOUTH: Pharynx moist, pink without exudate. No tonsillar enlargement  NECK: No lymphadenopathy.   CV: Regular rate and rhythm without murmurs, rubs or gallops.  RESP: Symmetric excursion.  Fine crackles in left base.  No wheezes, rhonchi    Labs:    No results found for this or any previous visit (from the past 168 hour(s)).    Assessment & Plan   1. Cough:  Concern for patchy infiltrate in left middle lobe on xray, rales on exam.  Will treat for possible pneumonia with course of doxycycline and prednisone.  If no improvement, return for re-evaluation.    - XR Chest PA and Lateral; Future  - predniSONE (DELTASONE) 20 MG tablet; Take 1 tablet (20 mg total) by mouth 2 (two) times a day for 5 days.  Dispense: 10 tablet; Refill: 0  - doxycycline (MONODOX) 100 MG capsule; Take 1 capsule (100 mg total) by mouth 2 (two) times a day for 10 days.  Dispense: 20 capsule; Refill: 0    2. Stye:  Advised to use warm compresses 3-4x/day.   - erythromycin ophthalmic ointment; " Administer into the left eye 4 (four) times a day for 5 days. Apply 1/2 inch of ointment to inner corner of eye four times a day for 5 days  Dispense: 3.5 g; Refill: 0    Vandana Warren CNP

## 2021-06-10 NOTE — PROGRESS NOTES
[facility] Daily Progress     Patient Name: Sharyn Mcfarlane  Date: 5/3/2017  Visit #: 2  PTA visit #:    PRECAUTIONS: osteoporosis  Referral Diagnosis: Neck stiffness  Referring provider: Melina So*  Visit Diagnosis:     ICD-10-CM    1. Chronic neck pain M54.2     G89.29    2. Decreased ROM of neck R29.898    3. Abnormal posture R29.3          Assessment:     HEP/POC compliance is  good .  Patient demonstrates understanding/independence with home program.  Patient is benefitting from skilled physical therapy and is making steady progress toward functional goals.  Patient is appropriate to continue with skilled physical therapy intervention, as indicated by initial plan of care.   No pain reported with all exercises.    Goal Status:  Pt. will be independent with home exercise program in : 4 weeks  Pt. will have improved quality of sleep: waking less times/night;in 4 weeks  Patient Turn Head: for driving;with full ROM;with less pain;in 4 weeks  Patient will decrease : NDI score;for improved quality of life;in 4 weeks;by _ points  by ___ points: >= 5    Plan / Patient Education:     Review HEP and progress as tolerated.  Consider a trial of GTB next week.    Subjective:     Pain Ratin/10 stiffness this morning, 0/10 previous week.  Woke up this morning at 5:00 am and had no pain or stiffness, but when she woke up at 6:30 am stiffness was present.  All other nights this week have been undisturbed.   Pt believes the exercises are working.    Objective:     Review of HEP per flow sheet.  Added seated neck retraction and bilateral levator scap stretching. Verbal cueing and demonstration required for correct performance of seated neck retraction.    Treatment Today    TREATMENT MINUTES COMMENTS   Evaluation     Self-care/ Home management     Manual therapy     Neuromuscular Re-education     Therapeutic Activity     Therapeutic Exercises 24 Per flow sheet   Gait training      Modality__________________                Total 24    Blank areas are intentional and mean the treatment did not include these items.     Jaymie Waite, [facility]  I attest that I was present in the room, making skilled assessments and directing the service provided today.  I was responsible for the assessment and treatment of the patient.  During this time, I was not engaged in treating another patient or doing other tasks.    Que Reddy  5/3/2017

## 2021-06-10 NOTE — PROGRESS NOTES
FEMALE ADULT PREVENTIVE EXAM    CHIEF COMPLAINT:  Female preventive exam.    SUBJECTIVE:  Sharyn Mcfarlane is a 62 y.o. female who presents for her routine physical exam.    Patient would like to address the following concerns today: She is having L sided neck pain. No hx of injury. This is intermittent. She feels that the neck is tight. No radiating pain down her arms.   No MOORE noted.       GYNE HISTORY  Menses: na  Sexually Active: yes  Contraception: na  Last Pap: 4-14  Abnormal Pap: yes, pre-cancerous and cause for hysterectomy  Vaginal Discharge: no      She  has a past medical history of Carpal tunnel syndrome; Fibrocystic breast; and Glaucoma.    Lab Results   Component Value Date    WBC 5.9 04/22/2016    HGB 15.0 04/22/2016    HCT 47.2 (H) 04/22/2016    MCV 92 04/22/2016     04/22/2016     04/22/2016    K 5.4 (H) 04/22/2016    BUN 17 04/22/2016     Lab Results   Component Value Date    CHOL 251 (H) 04/22/2016    HDL 72 04/22/2016    LDLCALC 157 (H) 04/22/2016    TRIG 110 04/22/2016     No results found for: TSH  BP Readings from Last 3 Encounters:   04/24/17 128/62   02/06/17 142/68   01/12/17 112/70       Surgeries:    Past Surgical History:   Procedure Laterality Date     HYSTERECTOMY  1990     OOPHORECTOMY       OR LAP,DIAGNOSTIC ABDOMEN      Description: Laparoscopy (Diagnostic);  Recorded: 03/31/2011;     OR REMOVAL OF TONSILS,<13 Y/O      Description: Tonsillectomy;  Recorded: 01/16/2009;     OR TOTAL ABDOM HYSTERECTOMY      Description: Hysterectomy;  Recorded: 01/20/2010;  Comments: vaginal       Family History:  Her family history includes Cancer in her father and mother; Colon cancer (age of onset: 88) in her father; Diabetes type II in her maternal uncle; Glaucoma in her father; Heart disease in her paternal grandfather; Ovarian cancer (age of onset: 65) in her mother.    Social History:  She  reports that she has quit smoking. She has never used smokeless tobacco. She reports that she  drinks about 3.5 oz of alcohol per week  She reports that she does not use illicit drugs.    Medications:    Current Outpatient Prescriptions:      albuterol (PROVENTIL HFA;VENTOLIN HFA) 90 mcg/actuation inhaler, Inhale 2 puffs every 6 (six) hours as needed for wheezing., Disp: 18 g, Rfl: 11     calcium carbonate (OS-ARNIE) 600 mg (1,500 mg) tablet, Take 600 mg by mouth 2 (two) times a day with meals., Disp: , Rfl:      GLUCOSAMINE SULFATE (GLUCOSAMINE ORAL), Take by mouth., Disp: , Rfl:      latanoprost (XALATAN) 0.005 % ophthalmic solution, , Disp: , Rfl:      multivitamin capsule, Take 1 capsule by mouth daily., Disp: , Rfl:   HELD MEDICATIONS: None.    Allergies:  No latex allergies.  No Known Allergies         RISK BEHAVIOR & HEALTH HABITS  Self Breast Exam: yes.  Regular Exercise: yyes.  Balanced diet: yes.  Seat Belt Use: yes  Calcium intake/Osteoporosis prevention: discussed calcium and Vitamin D .  Dexa: 3-17  Colonoscopy:4-16  Mammogram: 3-17    Guns: NO  Sun Screen: YES  Dental Care: YES    REVIEW OF SYSTEMS:  Complete head to toe review of systems is otherwise negative except as above.    OBJECTIVE:  VITAL SIGNS:    Vitals:    04/24/17 0847   BP: 128/62     GENERAL:  Patient alert, in no acute distress.  EYES: PERRLA. Extraoccular movements intact, pupils equal, reactive to light and accommodation.  ENT:  Hearing grossly normal.  Normal appearance to ears and nose.  Bilateral TM s, external canals, oropharynx normal. Normal lips, gums and teeth.  Normal nasal mucosa, septum and turbinates.  NECK:  Supple, without thyromegaly or mass.  RESP:  Clear to auscultation without crackles, wheezes or distress.  Normal respiratory effort.   CV:  Regular rate and rhythm without murmurs, rubs or gallops.  Normal carotid, abdominal aorta, femoral and pedal pulses.  No varicosities or edema.  ABDOMEN:  Soft, non-tender, without hepatosplenomegaly, masses, or hernias.   BREASTS:  Nontender, without masses, nipple  discharge, erythema, or axillary adenopathy.    :    External genitalia:  Normal without lesions.  Urethra: Normal appearing  Vagina: Normal without discharge  Cervix: absent. Sample taken from te cuff for pap smear.   Uterus:  Absent - no masses   Ovaries: absent - no adnexal masses   LYMPHATIC: Normal palpation of neck, groin and axilla..  No lymphadenopathy.  No bruising.  NEURO:  Non-focal and intact.  PSYCHIATRIC:  Alert & oriented with normal mood and affect.  Good judgment and insight.  SKIN:  Normal inspection and palpation.  MUSCULOSKELETAL: Normal gait and station.      ASSESSMENT & PLAN  Sharyn was seen today for annual exam.    Diagnoses and all orders for this visit:    Screening for deficiency anemia  -     HM1(CBC and Differential)  -     HM1 (CBC with Diff)    Screening for hyperlipidemia  -     Lipid Cascade    Screening for metabolic disorder  -     Basic Metabolic Panel    Screening for vaginal cancer  -     Gynecologic Cytology (PAP Smear)    Need for hepatitis C screening test  -     Hepatitis C Antibody (Anti-HCV)    Neck stiffness  -     XR Cervical Spine 2 - 3 VWS; Future      General  Immunizations reviewed and updated .  Recommended adequate calcium intake/osteoporosis prevention.  Discussed colon cancer screening at age 50, 45 if -American.  Diet and exercise reviewed,

## 2021-06-10 NOTE — PROGRESS NOTES
[facility] Daily Progress     Patient Name: Sharyn Mcfarlane  Date: 5/10/2017  Visit #: 3  PTA visit #:    PRECAUTIONS: osteoporosis  Referral Diagnosis: Neck stiffness  Referring provider: Melina So*  Visit Diagnosis:     ICD-10-CM    1. Chronic neck pain M54.2     G89.29    2. Decreased ROM of neck R29.898    3. Abnormal posture R29.3          Assessment:     HEP/POC compliance is  good .  Patient demonstrates understanding/independence with home program.  Patient is benefitting from skilled physical therapy and is making steady progress toward functional goals.   Goals nearing/met. Overall, patient reports significant improvement in left-sided neck pain intensity and frequency of sx. Now able to sleep through most nights without pain; when pain is present, reports decreased intensity compared to start of care. Pt is appropriate to transition to independent self-management via HEP at this time.     Goal Status:  Pt. will be independent with home exercise program in : 4 weeks MET  Pt. will have improved quality of sleep: waking less times/night;in 4 weeks MET  Patient Turn Head: for driving;with full ROM;with less pain;in 4 weeks MEt  Patient will decrease : NDI score;for improved quality of life;in 4 weeks;by _ points  by ___ points: >= 5 PROGRESSING    Plan / Patient Education:     Transition to independent self-management via HEP at this time.  Agreeable to hold chart x30 days for follow-up prn.    Subjective:     Pain Ratin/10 on L, but some pain with R side. She believes it is from her gardening this weekend.   Woke up 2 mornings last week with pain, but less than normal compared to previous pain every morning.     Objective:     NDI:10%(slightly improved from 14% on 17)  Review of HEP per flow sheet. Pt independent and compliant.  Cervical ROM  Date: 5/10/17 4/26/17    *Indicate scale AROM AROM AROM   Cervical Flexion 45 65    Cervical Extension 52 45     Right Left Right  Zofran sent to pharmacy for nausea. Please notify pt. Left Right Left   Cervical Sidebending 28 28 20 15     Cervical Rotation 63 63 65 60     Cervical Protraction      Cervical Retraction      Thoracic Flexion      Thoracic Extension      Thoracic Sidebending         Thoracic Rotation             Treatment Today    TREATMENT MINUTES COMMENTS   Evaluation     Self-care/ Home management     Manual therapy     Neuromuscular Re-education     Therapeutic Activity     Therapeutic Exercises 21 Per flow sheet   Gait training     Modality__________________                Total 21    Blank areas are intentional and mean the treatment did not include these items.     NARESH Crisostomo  I attest that I was present in the room, making skilled assessments and directing the service provided today.  I was responsible for the assessment and treatment of the patient.  During this time, I was not engaged in treating another patient or doing other tasks.    Que Reddy  5/10/2017      [facility] Discharge Summary  Patient Name: Sharyn Mcfarlane  Date: 6/13/2017  Referral Diagnosis: Neck stiffness  Referring provider: Melina So*  Visit Diagnosis:   1. Chronic neck pain     2. Decreased ROM of neck     3. Abnormal posture         Goals:  Pt. will be independent with home exercise program in : 4 weeks MET  Pt. will have improved quality of sleep: waking less times/night;in 4 weeks MET  Patient Turn Head: for driving;with full ROM;with less pain;in 4 weeks MEt  Patient will decrease : NDI score;for improved quality of life;in 4 weeks;by _ points  by ___ points: >= 5 PROGRESSING    Patient was seen for 3 visits from 04/26/17 to 05/10/17 with 0 missed appointments.  The patient met goals and has demonstrated understanding of and independence in the home program for self-care, and progression to next steps.  She will initiate contact if questions or concerns arise.    Therapy will be discontinued at this time.  The patient will need a new referral to  resume.    Thank you for your referral.  Que Reddy  6/13/2017  8:32 AM

## 2021-06-10 NOTE — PROGRESS NOTES
[facility]   Initial Evaluation    Patient Name: Sharyn Mcfarlane  Date of evaluation: 4/26/2017  PRECAUTIONS: osteoporosis  Referral Diagnosis: Neck stiffness  Referring provider: Melina So*  Visit Diagnosis:     ICD-10-CM    1. Chronic neck pain M54.2     G89.29    2. Decreased ROM of neck R29.898    3. Abnormal posture R29.3        Assessment:      Pt. is appropriate for skilled PT intervention as outlined in the Plan of Care (POC).  Pt. is a good candidate for skilled PT services to improve pain levels and function.  Signs/sx consistent with neck pain with mobility and postural deficits. POC to address deep neck flexor and scap retractor strengthening/endurance and postural re-education, progressing as tolerated. Plan to trial 1x/week for 3-4 weeks and then reassess need for further PT.    Goals:  Pt. will be independent with home exercise program in : 4 weeks  Pt. will have improved quality of sleep: waking less times/night;in 4 weeks  Patient Turn Head: for driving;with full ROM;with less pain;in 4 weeks  Patient will decrease : NDI score;for improved quality of life;in 4 weeks;by _ points  by ___ points: >= 5    Patient's expectations/goals are realistic.    Barriers to Learning or Achieving Goals:  No Barriers.       Plan / Patient Instructions:        Plan of Care:   Communication with: Referral Source  Patient Related Instruction: Nature of Condition;Treatment plan and rationale;Self Care instruction;Basis of treatment;Body mechanics;Posture;Precautions;Next steps;Expected outcome  Times per Week: 1  Number of Weeks: 4-8  Number of Visits: 8  Precautions / Restrictions : osteoporosis  Therapeutic Exercise: ROM;Stretching;Strengthening  Neuromuscular Reeducation: kinesio tape;posture  Manual Therapy: soft tissue mobilization;myofascial release    Plan for next visit: UBE > Review HEP, progressing ex as appropriate. Trial left levator scap stretch for HEP.     Subjective:      "  History of Present Illness:    Sharyn is a 62 y.o. female who presents to therapy today with complaints of chronic left-sided neck pain and stiffness.   Onset: Dec. 2015. Insidious and gradual onset. Feels to be degenerative arthritis in nature.  Duration: Constant  Worse with turning over in bed, turning head left > right. Worst first thing in the AM.  Better with stretching  Pain Medication: ibuprofen prn if more severe  Sleep: Reports waking 2-3x/night due to pain    Pt seeks PT to \"relieve pain in neck.\"    Pain Ratin  Pain rating at best: 1  Pain rating at worst: 9  Pain description: aching, dull, pain, soreness and stiff     Objective:      Patient Outcome Measures :    Neck Disability Score in %: 14   Scores range from 0-100%, where a score of 0% represents minimal pain and maximal function. The minmal clinically important difference is a score reduction of 10%.     PER EMR:  XR CERVICAL SPINE 2 - 3 VWS  2017 9:49 AM     INDICATION: Torticollis.  COMPARISON: None.     FINDINGS: There is good anatomic alignment of the C1 and C2 vertebral bodies. On the lateral image there is visualization from C1 to the bottom of T1. The prevertebral soft tissues and the predental space is maintained. There is a mild anterior listhesis  of C7 in relation to T1 with the rest of the cervical spine having good anatomic alignment. The vertebral body heights are well-maintained throughout. Degenerative disc disease noted at the C4-5 and the C5-6 disc space levels. These levels have a   moderate loss of height, endplate changes and small anterior osteophyte formations. There is prominent facet arthropathy noted at the C7-T1 disc space level.     This report was electronically interpreted by: Dr. Ness Jameson MD ON 2017 at 01:38    Examination  1. Chronic neck pain     2. Decreased ROM of neck     3. Abnormal posture       Precautions/Restrictions: osteoporosis  Involved side: Left  Posture Observation:      " General sitting posture is  fair.  General standing posture is fair.  Cervical:  Mild forward head  Shoulder/Thoracic complex: Moderate bilateral scapular protraction   Moderately increased upper thoracic kyphosis. Mildly improved with verbal cueing for correction.    Cervical ROM  Date:      *Indicate scale AROM AROM AROM   Cervical Flexion 65     Cervical Extension 45      Right Left Right Left Right Left   Cervical Sidebending 20 15 and PF       Cervical Rotation 65 60 with end-range stiffness       Cervical Protraction      Cervical Retraction      Thoracic Flexion      Thoracic Extension      Thoracic Sidebending         Thoracic Rotation           B Shoulder AROM WFL and NP t/o.  Strength  Date:      Cervical Myotomes/5 Right Left Right Left Right Left   Cervical Flexion (C1-2)         Cervical Sidebending (C3)         Shoulder Elevation (C4) 5 5       Shoulder Abduction (C5) 5 5       Elbow Flexion (C6) 5 5       Elbow Extension (C7) 5 5       Wrist Flexion (C7)         Wrist Extension (C6)         Thumb abduction (C8)         Finger Abduction (T1)             Denies pain with cervical distraction.  + pain and hypomobility with L/R cervical sidegliding.  Denies pain with supine central PA mobilizations.      Treatment Today     TREATMENT MINUTES COMMENTS   Evaluation 15 Cervical   Self-care/ Home management     Manual therapy     Neuromuscular Re-education     Therapeutic Activity     Therapeutic Exercises 25 -Discussed therapy diagnosis, prognosis, POC, relevant anatomy and basis for treatment, including large emphasis on posture.  -Reviewed and performed HEP with handouts provided.  -OTB issued.   Gait training     Modality__________________                Total 40    Blank areas are intentional and mean the treatment did not include these items.            PT Evaluation Code: (Please list factors)  Patient History/Comorbidities: osteoporosis  Examination: Cervical  Clinical Presentation: Stable  Clinical  Decision Making: Low    Patient History/  Comorbidities Examination  (body structures and functions, activity limitations, and/or participation restrictions) Clinical Presentation Clinical Decision Making (Complexity)   No documented Comorbidities or personal factors 1-2 Elements Stable and/or uncomplicated Low   1-2 documented comorbidities or personal factor 3 Elements Evolving clinical presentation with changing characteristics Moderate   3-4 documented comorbidities or personal factors 4 or more Unstable and unpredictable High Que Reddy  4/26/2017  8:55 AM

## 2021-06-11 NOTE — PROGRESS NOTES
Assessment and Plan:   1. Routine general medical examination at a health care facility  Fasting labs, PPSV23 and flu.  Mammogram ordered.  UTD on CRC screening.    - Pneumococcal polysaccharide vaccine 23-valent 1 yo or older, subq/IM  - Influenza,Quad,High Dose,PF 65 YR+    2. Age-related osteoporosis without current pathological fracture  Recheck DEXA. On year three of drug holiday.  Notable decline in BMD last year.  If persistent decline this year, restart Fosamax  - DXA Bone Density Scan; Future  - Vitamin D, Total (25-Hydroxy)    3. Strain of cervical portion of left trapezius muscle  Exam consistent with cervical muscle spasm. Recommended heat, massage, NSAIDs. She has PT exercises from the last time she participated in this and will resume daily.      4. Visit for screening mammogram  - Mammo Screening Bilateral; Future    5. Screening for cholesterol level  - Lipid Cascade    6. Screening for diabetes mellitus  - Basic Metabolic Panel    7. Screening for deficiency anemia  - Hemoglobin    8. Encounter for vitamin deficiency screening  - Vitamin D, Total (25-Hydroxy)    9. Encounter for immunization   - Pneumococcal polysaccharide vaccine 23-valent 1 yo or older, subq/IM  - Influenza,Quad,High Dose,PF 65 YR+  Patient has been advised of split billing requirements and indicates understanding: Yes    The patient's current medical problems were reviewed.    I have had an Advance Directives discussion with the patient.  The following health maintenance schedule was reviewed with the patient and provided in printed form in the after visit summary:   Health Maintenance   Topic Date Due     Pneumococcal Vaccine: 65+ Years (1 of 1 - PPSV23) 03/31/2019     MEDICARE ANNUAL WELLNESS VISIT  06/03/2020     FALL RISK ASSESSMENT  06/03/2020     INFLUENZA VACCINE RULE BASED (1) 08/01/2020     MAMMOGRAM  04/16/2021     DXA SCAN  04/16/2021     COLORECTAL CANCER SCREENING  05/28/2024     LIPID  06/03/2024     ADVANCE  CARE PLANNING  06/03/2024     TD 18+ HE  02/18/2029     HEPATITIS C SCREENING  Completed     ZOSTER VACCINES  Completed     Pneumococcal Vaccine: Pediatrics (0 to 5 Years) and At-Risk Patients (6 to 64 Years)  Aged Out     HEPATITIS B VACCINES  Aged Out        Subjective:   Chief Complaint: Sharyn Mcfarlane is an 65 y.o. female here for an Annual Wellness visit.   HPI:    She has been well.  Lives with .  Socially distancing from daughters - one in [employer] and one at a clinic as SW.  Outside visits with neighbors.      H/o osteoporosis.  Treated with Fosamax x8 years and started drug holiday in 2017.  DEXA 2019 showed worsening bone mineral density with decrease of 6.1% in the AP spine and 3% in the right hip. T-score -2.7 in spine.   Not exercising currently.  Was very regular with [employer] 4x/week.  Trying to motivate herself to do online workouts that are offered.      Recurrent neck spasms on left side of neck.  Resolves in between.  Feels tight.  No weakness, numbness, tingling.      Has noted some kyphosis, posture worsening.      OB/Gyn History:  Menstrual history: post menopausal    Preventive Health:  Reviewed and recommended screening and treatment recommendations:  Mammography: due  Colonoscopy: due 2024, h/o polyps  DEXA: due  Immunizations: due PPSV23    Health Habits:    Exercise: not currently.  Calcium intake/Osteoporosis prevention: yes      Review of Systems:   Please see above.  The rest of the review of systems are negative for all systems.    Patient Care Team:  Vandana Warren CNP as PCP - General (Nurse Practitioner)  Vandana Warren CNP as Assigned PCP     Patient Active Problem List   Diagnosis     Unspecified glaucoma     Rosacea     Age-related osteoporosis without current pathological fracture     History of skin cancer     Family history of ovarian cancer     Past Medical History:   Diagnosis Date     Carpal tunnel syndrome      Fibrocystic breast      Glaucoma       Past Surgical History:    Procedure Laterality Date     HYSTERECTOMY  1990     OOPHORECTOMY       MA LAP,DIAGNOSTIC ABDOMEN      Description: Laparoscopy (Diagnostic);  Recorded: 03/31/2011;     MA REMOVAL OF TONSILS,<13 Y/O      Description: Tonsillectomy;  Recorded: 01/16/2009;     MA TOTAL ABDOM HYSTERECTOMY      Description: Hysterectomy;  Recorded: 01/20/2010;  Comments: vaginal      Family History   Problem Relation Age of Onset     Ovarian cancer Mother 65     Cancer Mother         skin     Colon cancer Father 88     Cancer Father         parotid     Glaucoma Father      Heart disease Paternal Grandfather      Diabetes type II Maternal Uncle       Social History     Socioeconomic History     Marital status:      Spouse name: Not on file     Number of children: Not on file     Years of education: Not on file     Highest education level: Not on file   Occupational History     Not on file   Social Needs     Financial resource strain: Not on file     Food insecurity     Worry: Not on file     Inability: Not on file     Transportation needs     Medical: Not on file     Non-medical: Not on file   Tobacco Use     Smoking status: Former Smoker     Smokeless tobacco: Never Used   Substance and Sexual Activity     Alcohol use: Yes     Alcohol/week: 5.8 standard drinks     Types: 7 Standard drinks or equivalent per week     Drug use: No     Sexual activity: Yes     Partners: Male     Birth control/protection: Post-menopausal   Lifestyle     Physical activity     Days per week: Not on file     Minutes per session: Not on file     Stress: Not on file   Relationships     Social connections     Talks on phone: Not on file     Gets together: Not on file     Attends Sabianist service: Not on file     Active member of club or organization: Not on file     Attends meetings of clubs or organizations: Not on file     Relationship status: Not on file     Intimate partner violence     Fear of current or ex partner: Not on file     Emotionally  "abused: Not on file     Physically abused: Not on file     Forced sexual activity: Not on file   Other Topics Concern     Not on file   Social History Narrative    ,  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       Current Outpatient Medications   Medication Sig Dispense Refill     calcium carbonate (OS-ARNIE) 600 mg (1,500 mg) tablet Take 600 mg by mouth 2 (two) times a day with meals.       FINACEA 15 % gel   11     latanoprost (XALATAN) 0.005 % ophthalmic solution        multivitamin capsule Take 1 capsule by mouth daily.       No current facility-administered medications for this visit.       Objective:   Vital Signs:   Visit Vitals  /70 (Patient Site: Right Arm, Patient Position: Sitting, Cuff Size: Adult Regular)   Pulse 60   Temp 98.3  F (36.8  C) (Oral)   Resp 16   Ht 5' 4\" (1.626 m)   Wt 138 lb 4 oz (62.7 kg)   BMI 23.73 kg/m           VisionScreening:  No exam data present     PHYSICAL EXAM  GENERAL: Alert, well appearing  PSYCH: Pleasant mood, affect appropriate.  Good judgment and insight.  Intact recent and remote memory.  Good eye contact.  SKIN: No atypical lesions  EYES: Conjunctiva pink, sclera white, no exudates. OVIDIO.  EOMs intact.   EARS: TMs pearly grey, no bulging, redness, retraction. Hearing grossly normal.   MOUTH: Pharynx moist, pink without exudate. No tonsillar enlargement  NECK: No lymphadenopathy. Thyroid borders smooth without enlargement, nodules.   CV: Regular rate and rhythm without murmurs, rubs or gallops.  RESP: Lung sounds clear  ABDOMEN: BS+. Abdomen soft, nontender to palpation without guarding. No organomegaly, masses or hernias  PV : LE's warm. No edema, tenderness or varicosities. Pedal pulses 2+  BREASTS: Breasts symmetric, no dimpling, masses or skin discolorations seen. Areolas and nipples symmetric without discharge. On palpation, breast tissue supple and nontender. No masses or nodules. Axillary and epitrochlear lymph nodes nonpalpable.   LYMPHATIC: " Normal palpation of neck, groin and axilla..  No lymphadenopathy.     MSK:  Full ROM of neck.  No spinal TTP.  Significant tension and tenderness in left trapezius muscle.  UE strength 5/5 bilaterally.  DTRs 2/4.       Assessment Results 10/1/2020   Activities of Daily Living No help needed   Instrumental Activities of Daily Living No help needed   Get Up and Go Score Less than 12 seconds   Mini Cog Total Score 4   Some recent data might be hidden     A Mini-Cog score of 0-2 suggests the possibility of dementia, score of 3-5 suggests no dementia        Identified Health Risks:     Information regarding advance directives (living [place]), including where she can download the appropriate form, was provided to the patient via the AVS.

## 2021-06-11 NOTE — PROGRESS NOTES
Subjective: Sharyn is a 62-year-old lady who is here complaining of a small tender mass in the medial aspect of her left lower extremity.  She has noted this relatively recently only over the last week or so.  She states she especially notes this after exercise.  The redness surrounding this only started a day or so ago.  This has not increased in size.  She does have a history of varicose veins but is not had any trouble with in the past.  She denies any shortness of breath.  She denies any swelling of the leg itself.  No chest pain noted.    Objective: Vital signs: Blood pressure 110/62 height is 5 feet 4 weight is 141 this lady is alert and in no acute distress examination of her left leg shows a small area of redness with induration underneath this this measures about 2-2-1/2 cm in diameter this is located oln the medial aspect of the L knee. This is tender to palpation. She has moderate varicosities in both Le. Pedal pulses are intact bilaterally.     ASS - 1. What appears to be a superficial thrombophlebitis in the L LE.     PLAN - 1. I would want to RO a DVT. appt made for a Doopler US of the LLE.                2. We discussed using gentle heat on this. Taking an  mg a day. Elevating as much as possible.               3. FU should be if this is not resolving in 3-4 days or if increaing in size.

## 2021-06-13 NOTE — PROGRESS NOTES
Subjective: Sharyn is a 62-year-old lady who is here for a Pap and pelvic.  The remainder of her physical exam was done at another time.  She has no significant complaints at this time.  Denies any abnormal bleeding.  Denies any incontinence.    Objective: Vital signs: Vital signs are stable she is afebrile.  GYN exam normal external female genitalia.  Urethra appears normal.  Labia appear normal.  Speculum exam cervix is normal to appearance without lesions.  Vaginal walls appear normal.  Sample is taken for Pap smear.  Bimanual exam cervix is palpably normal no tenderness on cervical motion uterus is midline without masses or tenderness no adnexal masses appreciated.    Assessment: Normal Pap and pelvic exam on this 62-year-old postmenopausal lady    Plan: Pap smear taken pending patient to be informed of results.

## 2021-06-14 NOTE — PROGRESS NOTES
SUBJECTIVE       Sharyn Mcfarlane is a 65 y.o.  female with a PMH significant for:     Patient Active Problem List   Diagnosis     Unspecified glaucoma     Rosacea     Age-related osteoporosis without current pathological fracture     History of skin cancer     Family history of ovarian cancer     She presents with painful varicose veins.    Patient notes she woke up Friday morning with a red, swollen, tender area on her left knee.  Notes she has a history of varicose veins in this area.  Patient notes it felt firm to the touch.  Patient denied any prior trauma.  Patient denied any recent immobilization.  Any recent illnesses.  Patient has not had an issue with this in the past.  Patient notes since that time the swelling has started to improve.  She still notes a ropey texture along the vessels.  Patient noted she had a blood clot in her lung in the 70s.  This was attributed to birth control.    Denies any current medications.    PMH, Medications and Allergies were reviewed and updated as needed.        REVIEW OF SYSTEMS     Pertinent items are noted in HPI.        OBJECTIVE     Vitals:    01/12/21 1727   BP: 126/78   Patient Site: Left Arm   Patient Position: Sitting   Cuff Size: Adult Regular   Pulse: 63   SpO2: 97%   Weight: 141 lb 5 oz (64.1 kg)     Body mass index is 24.26 kg/m .    Constitutional: Awake, alert, cooperative, no apparent distress, and appears stated age.  Eyes: Lids and lashes normal, sclera clear, conjunctiva normal.  ENT: Normocephalic, without obvious abnormality, atramatic,   Lungs: No increased work of breathing, good air exchange, clear to auscultation bilaterally, no crackles or wheezing.  Cardiovascular: Regular rate and rhythm, normal S1 and S2, no S3 or S4, and no murmur noted.  Musculoskeletal: Patient has some mild swelling to the patella.  This is firm.  Also noted to have a ropey vessel consistent with a thrombosis.  Current erythema.  Slightly tender to the touch.  No other  changes in superficial vessels.  No pain in the calf.  Neurologic: Awake, alert, oriented to name, place and time.  Cranial nerves II-XII are grossly intact and gait is normal.    Results for orders placed or performed in visit on 10/01/20   Lipid Cascade   Result Value Ref Range    Cholesterol 219 (H) <=199 mg/dL    Triglycerides 77 <=149 mg/dL    HDL Cholesterol 75 >=50 mg/dL    LDL Calculated 129 <=129 mg/dL    Patient Fasting > 8hrs? Yes    Basic Metabolic Panel   Result Value Ref Range    Sodium 140 136 - 145 mmol/L    Potassium 4.6 3.5 - 5.0 mmol/L    Chloride 101 98 - 107 mmol/L    CO2 26 22 - 31 mmol/L    Anion Gap, Calculation 13 5 - 18 mmol/L    Glucose 86 70 - 125 mg/dL    Calcium 9.6 8.5 - 10.5 mg/dL    BUN 11 8 - 22 mg/dL    Creatinine 0.74 0.60 - 1.10 mg/dL    GFR MDRD Af Amer >60 >60 mL/min/1.73m2    GFR MDRD Non Af Amer >60 >60 mL/min/1.73m2   Hemoglobin   Result Value Ref Range    Hemoglobin 13.3 12.0 - 16.0 g/dL   Vitamin D, Total (25-Hydroxy)   Result Value Ref Range    Vitamin D, Total (25-Hydroxy) 48.7 30.0 - 80.0 ng/mL       ASSESSMENT AND PLAN     Shayrn was seen today for varicose veins.    Diagnoses and all orders for this visit:    Acute superficial venous thrombosis of lower extremity, left: History and physical consistent with superficial venous thrombosis of the varicose veins.  Patient has noted improvement in her symptoms.  Not located in the great or small saphenous veins.  According to up-to-date no further work-up or treatment is necessary as long as they continue to improve.  Should pain worsen or concern for DVT develop would get a ultrasound.  Advised patient to let us know if things worsen in the next week.  Otherwise discussed natural progression of thrombosis.  Encouraged her to use Tylenol, ibuprofen, heat, or ice as needed for pain.  Patient in agreement with this plan.    RTC if develops new or worsening symptoms.    Cynthia Cobb DO

## 2021-06-16 PROBLEM — Z80.41 FAMILY HISTORY OF OVARIAN CANCER: Status: ACTIVE | Noted: 2018-05-30

## 2021-06-16 PROBLEM — Z85.828 HISTORY OF SKIN CANCER: Status: ACTIVE | Noted: 2018-05-30

## 2021-06-17 NOTE — PATIENT INSTRUCTIONS - HE
Patient Instructions by Vandana Warren CNP at 6/3/2019  8:00 AM     Author: Vandana Warren CNP Service: -- Author Type: Nurse Practitioner    Filed: 6/3/2019  8:50 AM Encounter Date: 6/3/2019 Status: Addendum    : Vandana Warren CNP (Nurse Practitioner)    Related Notes: Original Note by Vandana Warren CNP (Nurse Practitioner) filed at 6/3/2019  8:16 AM       Recommendations from today's visit                                                       1. We will plan to repeat your bone density scan in one year and could discuss restarting medication if needed at that time.  Until then, continue with your calcium and vitamin D and weight bearing exercise as well as strength training.      2. We will repeat your second pneumonia vaccine in one year.       Next appointment: physical, one year    To reschedule your appointment, please call the clinic directly at [phone].   It was a pleasure seeing you today! I look forward to seeing you again.              Patient Education   Understanding Advance Care Planning  Advance care planning is the process of deciding ones own future medical care. It helps ensure that if you cant speak for yourself, your wishes can still be carried out. The plan is a series of legal documents that note a persons wishes. The documents vary by state. Advance care planning may be done when a person has a serious illness that is expected to get worse. It may be done before major surgery. And it can help you and your family be prepared in case of a major illness or injury. Advance care planning helps with making decisions at these times.       A health care proxy is a person who acts as the voice of a patient when the patient cant speak for himself or herself. The name of this role varies by state. It may be called a Durable Medical Power of  or Durable Power of  for Healthcare. It may be called an agent, surrogate, or advocate. Or it may be called a representative or decision  maker. It is an official duty that is identified by a legal document. The document also varies by state.    Why Is Advance Care Planning Important?  If a person communicates their healthcare wishes:    They will be given medical care that matches their values and goals.    Their family members will not be forced to make decisions in a crisis with no guidance.  Creating a Plan  Making an advance care plan is often done in 3 steps:    Thinking about ones wishes. To create an advance care plan, you should think about what kind of medical treatment you would want if you lose the ability to communicate. Are there any situations in which you would refuse or stop treatment? Are there therapies you would want or not want? And whom do you want to make decisions for you? There are many places to learn more about how to plan for your care. Ask your doctor or  for resources.    Picking a health care proxy. This means choosing a trusted person to speak for you only when you cant speak for yourself. When you cannot make medical decisions, your proxy makes sure the instructions in your advance care plan are followed. A proxy does not make decisions based on his or her own opinions. They must put aside those opinions and values if needed, and carry out your wishes.    Filling out the legal documents. There are several kinds of legal documents for advance care planning. Each one tells health care providers your wishes. The documents may vary by state. They must be signed and may need to be witnessed or notarized. You can cancel or change them whenever you wish. Depending on your state, the documents may include a Healthcare Proxy form, Living Will, Durable Medical Power of , Advance Directive, or others.  The Familys Role  The best help a family can give is to support their loved ones wishes. Open and honest communication is vital. Family should express any concerns they have about the patients choices while the  patient can still make decisions.    [phone] [employer]. [address] [place] [address], [place] [address]. All rights reserved. This information is not intended as a substitute for professional medical care. Always follow your healthcare professional's instructions.         Also, [employer] [place] offers a free, downloadable health care directive that allows you to share your treatment choices and personal preferences if you cannot communicate your wishes. It also allows you to appoint another person (called a health care agent) to make health care decisions if you are unable to do so. You can download an advance directive by going here: [email or web address]     Patient Education   Personalized Prevention Plan  You are due for the preventive services outlined below.  Your care team is available to assist you in scheduling these services.  If you have already completed any of these items, please share that information with your care team to update in your medical record.  Health Maintenance   Topic Date Due   ? ADVANCE DIRECTIVES DISCUSSED WITH PATIENT  01/05/2012   ? PNEUMOCOCCAL POLYSACCHARIDE VACCINE AGE 65 AND OVER  03/31/2019   ? FALL RISK ASSESSMENT  03/31/2019   ? MAMMOGRAM  04/16/2021   ? DXA SCAN  04/16/2021   ? COLONOSCOPY  05/28/2024   ? TD 18+ HE  02/18/2029   ? INFLUENZA VACCINE RULE BASED  Completed   ? TDAP ADULT ONE TIME DOSE  Completed   ? PNEUMOCOCCAL CONJUGATE VACCINE FOR ADULTS (PCV13 OR PREVNAR)  Completed   ? ZOSTER VACCINES  Completed

## 2021-06-18 NOTE — PROGRESS NOTES
FEMALE PREVENTIVE EXAM    Assessment & Plan   1. Routine general medical examination at a health care facility:  Will check on coverage for Shingrix.  Discussed cervical cancer screening, no abnormal pap in the last ten years.  Paps have shown scant cellularity s/p hysterectomy though not a reason to repeat this.  May reasonably discontinue screening at this time.    - Varicella Zoster, Recombinant Vaccine IM; Standing    2. Osteopenia:  Recheck vitamin D, continue daily calcium and repeat DEXA in one year.    - Vitamin D, Total (25-Hydroxy)    3. Screening for diabetes mellitus  - Glucose    4. Screening for deficiency anemia  - Hemoglobin    5. Screening for cholesterol level  - Lipid Cascade    6. Screening for thyroid disorder  - Thyroid Cascade    7. History of basal cell cancer: continue to follow with dermatology    8. Family history of ovarian cancer: s/p hysterectomy. Continue annual mammography.     9. Varicose veins of both lower extremities:  Not symptomatic, she does not desire any intervention at this time.      Recommended adequate calcium intake/osteoporosis prevention, Discussed breast cancer screening guidelines, Discussed colon cancer screening at age 50, 45 if -American and Discussed diet, including moderation of portions sizes, avoiding eating out and fast food and increase in fruits and vegetables    Vandana Warren CNP    Subjective:   Chief Complaint:  Establish Care and Annual Exam (fasting; not due for pap)    HPI:  Sharyn Mcfarlane is a 63 y.o. female who presents for routine physical exam.  She is a previous patient of Dr. So.  PMH notable for osteoporosis, rosacea, glaucoma, BCC. She is retired. Her middle daughter is getting  this summer in [place], looking forward to this.     Osteoporosis/osteopenia:  Previous treatment with Foxamax x8 years.  Stopped last year. DEXA at that time showed stability, osteopenia. Now on drug holiday.  Calcium with vitamin D in addition  to daily MVI.     Varicose veins, left leg: Not painful, no aching but more prominent after standing for long periods.      OB/Gyn History:  Date of previous pap: 2017  History of abnormal pap: none    Preventive Health:  Reviewed and recommended screening and treatment recommendations:  Mammography: annually, up to date  DEXA:  Repeat due in 2019  Colonoscopy: due 2019  Immunizations: Shingrix    Health Habits:    Exercise: yes, walking.  Calcium intake/Osteoporosis prevention: yes    PMH:   Patient Active Problem List   Diagnosis     Glaucoma     Rosacea     Osteoporosis       Past Medical History:   Diagnosis Date     Carpal tunnel syndrome      Fibrocystic breast      Glaucoma        Current Medications: Reviewed   Current Outpatient Prescriptions on File Prior to Visit   Medication Sig Dispense Refill     calcium carbonate (OS-ARNIE) 600 mg (1,500 mg) tablet Take 600 mg by mouth 2 (two) times a day with meals.       FINACEA 15 % gel   11     latanoprost (XALATAN) 0.005 % ophthalmic solution        multivitamin capsule Take 1 capsule by mouth daily.       [DISCONTINUED] GLUCOSAMINE SULFATE (GLUCOSAMINE ORAL) Take by mouth.       No current facility-administered medications on file prior to visit.        Allergies:  Reviewed  has No Known Allergies.    Social History:  Social History     Occupational History     Not on file.     Social History Main Topics     Smoking status: Former Smoker     Smokeless tobacco: Never Used     Alcohol use 3.5 oz/week     7 Standard drinks or equivalent per week     Drug use: No     Sexual activity: Yes     Partners: Male     Birth control/ protection: Post-menopausal       Family History:   Family History   Problem Relation Age of Onset     Ovarian cancer Mother 65     Cancer Mother      skin     Colon cancer Father 88     Cancer Father      parotid     Glaucoma Father      Heart disease Paternal Grandfather      Diabetes type II Maternal Uncle          Review of Systems:  Complete  "head to toe review of systems is otherwise negative except as above.    Objective:    BP 98/56 (Patient Site: Right Arm, Patient Position: Sitting, Cuff Size: Adult Regular)  Pulse 64  Ht 5' 4\" (1.626 m)  Wt 130 lb 4 oz (59.1 kg)  SpO2 98%  BMI 22.36 kg/m2    GENERAL: Alert, well-appearing female  PSYCH: Pleasant mood, affect appropriate.    SKIN: No atypical lesions  EYES: Conjunctiva pink, sclera white, no exudates. OVIDIO.  EOMs intact.   EARS: TMs pearly grey, no bulging, redness, retraction.   MOUTH: Pharynx moist, pink without exudate. No tonsillar enlargement  NECK: No lymphadenopathy. Thyroid borders smooth without enlargement, nodules.   CV: Regular rate and rhythm without murmurs, rubs or gallops.  RESP: Lung sounds clear  ABDOMEN: BS+. Abdomen soft.  No organomegaly  BREASTS: Breasts symmetric, no dimpling, masses or skin discolorations seen. Areolas and nipples symmetric without discharge. On palpation, breast tissue supple and nontender. No masses or nodules. Axillary and epitrochlear lymph nodes nonpalpable.   PV :  No edema        "

## 2021-06-18 NOTE — PATIENT INSTRUCTIONS - HE
Patient Instructions by Vandana Warren CNP at 10/1/2020  8:00 AM     Author: Vandana Warren CNP Service: -- Author Type: Nurse Practitioner    Filed: 10/1/2020  8:29 AM Encounter Date: 10/1/2020 Status: Signed    : Vandana Warren CNP (Nurse Practitioner)         Patient Education   Understanding Advance Care Planning  Advance care planning is the process of deciding ones own future medical care. It helps ensure that if you cant speak for yourself, your wishes can still be carried out. The plan is a series of legal documents that note a persons wishes. The documents vary by state. Advance care planning may be done when a person has a serious illness that is expected to get worse. It may be done before major surgery. And it can help you and your family be prepared in case of a major illness or injury. Advance care planning helps with making decisions at these times.       A health care proxy is a person who acts as the voice of a patient when the patient cant speak for himself or herself. The name of this role varies by state. It may be called a Durable Medical Power of  or Durable Power of  for Healthcare. It may be called an agent, surrogate, or advocate. Or it may be called a representative or decision maker. It is an official duty that is identified by a legal document. The document also varies by state.    Why Is Advance Care Planning Important?  If a person communicates their healthcare wishes:    They will be given medical care that matches their values and goals.    Their family members will not be forced to make decisions in a crisis with no guidance.  Creating a Plan  Making an advance care plan is often done in 3 steps:    Thinking about ones wishes. To create an advance care plan, you should think about what kind of medical treatment you would want if you lose the ability to communicate. Are there any situations in which you would refuse or stop treatment? Are there therapies you  would want or not want? And whom do you want to make decisions for you? There are many places to learn more about how to plan for your care. Ask your doctor or  for resources.    Picking a health care proxy. This means choosing a trusted person to speak for you only when you cant speak for yourself. When you cannot make medical decisions, your proxy makes sure the instructions in your advance care plan are followed. A proxy does not make decisions based on his or her own opinions. They must put aside those opinions and values if needed, and carry out your wishes.    Filling out the legal documents. There are several kinds of legal documents for advance care planning. Each one tells health care providers your wishes. The documents may vary by state. They must be signed and may need to be witnessed or notarized. You can cancel or change them whenever you wish. Depending on your state, the documents may include a Healthcare Proxy form, Living Will, Durable Medical Power of , Advance Directive, or others.  The Familys Role  The best help a family can give is to support their loved ones wishes. Open and honest communication is vital. Family should express any concerns they have about the patients choices while the patient can still make decisions.    [id number] [employer]. [address] [place] [address], [place] [address]. All rights reserved. This information is not intended as a substitute for professional medical care. Always follow your healthcare professional's instructions.         Also, [employer] [place] offers a free, downloadable health care directive that allows you to share your treatment choices and personal preferences if you cannot communicate your wishes. It also allows you to appoint another person (called a health care agent) to make health care decisions if you are unable to do so. You can download an advance directive by going here:  [email or web address]     Patient Education   Personalized Prevention Plan  You are due for the preventive services outlined below.  Your care team is available to assist you in scheduling these services.  If you have already completed any of these items, please share that information with your care team to update in your medical record.  Health Maintenance   Topic Date Due   ? Pneumococcal Vaccine: 65+ Years (1 of 1 - PPSV23) 03/31/2019   ? MEDICARE ANNUAL WELLNESS VISIT  06/03/2020   ? FALL RISK ASSESSMENT  06/03/2020   ? INFLUENZA VACCINE RULE BASED (1) 08/01/2020   ? MAMMOGRAM  04/16/2021   ? DXA SCAN  04/16/2021   ? COLORECTAL CANCER SCREENING  05/28/2024   ? LIPID  06/03/2024   ? ADVANCE CARE PLANNING  06/03/2024   ? TD 18+ HE  02/18/2029   ? HEPATITIS C SCREENING  Completed   ? ZOSTER VACCINES  Completed   ? Pneumococcal Vaccine: Pediatrics (0 to 5 Years) and At-Risk Patients (6 to 64 Years)  Aged Out   ? HEPATITIS B VACCINES  Aged Out

## 2021-06-20 NOTE — PROGRESS NOTES
[facility] Daily Progress     Patient Name: Sharyn Mcfarlane  Date: 10/9/2018  Visit #: 3/12  PTA visit #: 1   Referral Diagnosis: Right leg pain  Referring provider: Rizwan Saez MD  Visit Diagnosis:     ICD-10-CM    1. Right-sided low back pain with right-sided sciatica, unspecified chronicity M54.41    2. Right leg pain M79.604    3. Osteopenia M85.80          Assessment:     HEP/POC compliance is  good .  Patient demonstrates understanding/independence with home program.  Response to Intervention good  Patient is appropriate to continue with skilled physical therapy intervention, as indicated by initial plan of care.    Goal Status:  Pt. will demonstrate/verbalize independence in self-management of condition in : 6 weeks  Pt. will be independent with home exercise program in : 6 weeks  Pt. will have improved quality of sleep: waking less times/night;in 6 weeks;Comment  Comment:: waking 1-2x/night  Pt. will be able to walk : 20 minutes;for household mobility;for community mobility;with less pain;with less difficulty;in 6 weeks  Patient will sit: 30 minutes;for eating;for watching TV;with less pain;with less difficultty;in 6 weeks  Patient will transfer: sit/stand;supine/sit;for in/out of bed;for in/out of chair;with less pain;with less difficulty;in 6 weeks  No Data Recorded    Plan / Patient Education:     Continue with initial plan of care.  Progress with home program as tolerated.    Subjective:     Pain Ratin  Sx are good today. No pain complaints today. Painful last night in (R) LB and dorsal foot. Leg sx are becoming more intermittent, about 50% improved.      Objective:     (R) PSIS post in standing. (R) FB test (+).  Tenderness of (R) ant/med tibia/mult levels.   Abdominal recruitment is fair.     Treatment Today     TREATMENT MINUTES COMMENTS   Evaluation     Self-care/ Home management     Manual therapy 20 Induction, indirect, direct techniques utilized as appropriate for optimal  tissue release.   MFR/SCS - pelvic diaphragm, (R) hemipelvis, (R) SMEDL1-3-A, supine LS traction,    Neuromuscular Re-education     Therapeutic Activity     Therapeutic Exercises 10 Posture education.   Exercises per flow sheet. Added abdominal ex.    Gait training     Modality__________________                Total 30    Blank areas are intentional and mean the treatment did not include these items.       Stephanie Treviño  10/9/2018

## 2021-06-20 NOTE — PROGRESS NOTES
[facility]   [facility]-Pelvic Initial Evaluation    Patient Name: Sharyn Mcfarlane  Date of evaluation: 10/1/2018  Visit #1  Referral Diagnosis: Right leg pain  Referring provider: Rizwan Saez MD  Visit Diagnosis:     ICD-10-CM    1. Right-sided low back pain with right-sided sciatica, unspecified chronicity M54.41    2. Right leg pain M79.604    3. Osteopenia M85.80        Assessment:   Sharyn Mcfarlane is a 63 y.o. female who presents to therapy today with chief complaints of (R) low back, buttock and leg pain. Onset date of sx was 5/18.  Functional impairments include standing, sitting, walking, sleeping, transfers, household tasks.  Clinical findings include posture deficits and asymmetries, decreased trunk ROM with pain, normal LE myotomal strength, normal reflexes, (-) neural tension tests, decreased (B) hip ROM.          Pt. is appropriate for skilled PT intervention as outlined in the Plan of Care (POC).  Pt. is a good candidate for skilled PT services to improve pain levels and function.    Goals:  Pt. will demonstrate/verbalize independence in self-management of condition in : 6 weeks  Pt. will be independent with home exercise program in : 6 weeks  Pt. will have improved quality of sleep: waking less times/night;in 6 weeks;Comment  Comment:: waking 1-2x/night  Pt. will be able to walk : 20 minutes;for household mobility;for community mobility;with less pain;with less difficulty;in 6 weeks  Patient will sit: 30 minutes;for eating;for watching TV;with less pain;with less difficultty;in 6 weeks  Patient will transfer: sit/stand;supine/sit;for in/out of bed;for in/out of chair;with less pain;with less difficulty;in 6 weeks  No Data Recorded    Patient's expectations/goals are realistic.    Barriers to Learning or Achieving Goals:  No Barriers.       Plan / Patient Instructions:        Plan of Care:   Authorization / Certification Number of Visits: pref one  Communication with: Referral  Source  Patient Related Instruction: Nature of Condition;Treatment plan and rationale;Self Care instruction;Basis of treatment;Body mechanics;Posture;Next steps  Times per Week: 1-2  Number of Weeks: 6-12  Number of Visits: up to 12  Discharge Planning: HEP, self management  Precautions / Restrictions : osteopenia  Therapeutic Exercise: ROM;Stretching;Strengthening  Neuromuscular Reeducation: posture;kinesio tape;core  Manual Therapy: strain counterstrain;myofascial release    POC and pathology of condition were reviewed with patient.  Pt. is in agreement with the Plan of Care  A Home Exercise Program (HEP) was initiated today.    Plan for next visit: manual therapy, posture education, progression of home program.     Subjective:         Social information:   Living Situation:single family home   Occupation:retired   Work Status:NA   Equipment Available: None    History of Present Illness:    Sharyn is a 63 y.o. female who presents to therapy today with complaints of (R) buttock and leg pain to the foot. Date of onset/duration of symptoms is 5/18. Onset was gradual and insidious. Sx started in (R) LB, buttock. Buttock symptoms are mostly constant. Leg symptoms began about 1 1/2 months ago and are intermittent. Describes pain, tingling, numbness. Denies LE weakness. Symptoms are getting worse.She denies history of similar symptoms. She describes their previous level of function as not limited  She notes the pain got worse, prompted her to see her doctor. Not sure what aggravates it. Better with stretching, OTC meds.     Pain Rating:3  Pain rating at best: 2  Pain rating at worst: 8  Pain description: numbness, pain and tingling    Functional limitations are described as occurring with:   sitting 5 min  sleeping  standing 5 min  transitional movements getting in  bed and chair and getting out of  bed and chair  walking 5 min  waking 4-5x/night    Patient reports benefit from:  anti-inflammatory, pain medication    Past  Medical History:   Diagnosis Date     Carpal tunnel syndrome      Fibrocystic breast      Glaucoma      Past Surgical History:   Procedure Laterality Date     HYSTERECTOMY  1990     OOPHORECTOMY       IA LAP,DIAGNOSTIC ABDOMEN      Description: Laparoscopy (Diagnostic);  Recorded: 03/31/2011;     IA REMOVAL OF TONSILS,<13 Y/O      Description: Tonsillectomy;  Recorded: 01/16/2009;     IA TOTAL ABDOM HYSTERECTOMY      Description: Hysterectomy;  Recorded: 01/20/2010;  Comments: vaginal     Imaging per Epic:  XR LUMBAR SPINE 2 OR 3 VWS  9/28/2018 3:03 PM     INDICATION: Pain in right leg  COMPARISON: None.     FINDINGS: There are 5 lumbar type vertebral bodies. There is mild levoscoliosis of the thoracolumbar junction. There is a slight retrolisthesis of L2 in relation to L3. The rest of the lumbar spine has good anatomic alignment. The lumbar vertebral body heights are well-maintained throughout. There is slight wedging of the T12 vertebral body most likely physiologic in nature. There is mild diffuse degenerative changes of the lumbar disc spaces with a slight loss of height, endplate changes and small anterior osteophyte formations. There is mild facet arthropathy from L3 to the sacrum. There is trace vascular calcification of the abdominal aorta and its branches.      XR PELVIS W 2 VW HIP RIGHT  9/28/2018 3:03 PM     INDICATION: Pain in right leg  COMPARISON: None.     FINDINGS: There are mild degenerative changes in the hip joints. No fracture or dislocation.       Objective:       Patient Active Problem List   Diagnosis     Glaucoma     Rosacea     Osteopenia     History of basal cell cancer     Family history of ovarian cancer       Note: Items left blank indicates the item was not performed or not indicated at the time of the evaluation.    Patient Outcome Measures :    Modified Oswestry Low Back Pain Disablity Questionnaire  in %: 22   Scores range from 0-100%, where a score of 0% represents minimal pain and  maximal function. The minimal clinically important difference is a score reduction of 12%.  Lower Extremity Functional Scale (_/80): 63   Scores range from 0-80, where a score of 80 represents maximum function. The minimal clinically important difference is a positive change of 9 points.    Examination  1. Right-sided low back pain with right-sided sciatica, unspecified chronicity     2. Right leg pain     3. Osteopenia       Precautions/Restrictions: osteopenia  Involved side: Right  Posture Observation:      General sitting posture is  fair.  General standing posture is fair.  Cervical:  Moderate forward head  Shoulder/Thoracic complex: Severely increased upper thoracic kyphosis  Severely increased mid thoracic kyphosis  Lumbopelvic complex: Mildly increased lumbar lordosis  Pelvic alignment: (L) PSIS and sacral base ant in standing  Scoliosis    Lumbar ROM:    % of normal  Date:      *Indicate scale AROM AROM AROM   Lumbar Flexion WFL (-)     Lumbar Extension 75% (R) LBP      Right Left Right Left Right Left   Lumbar Sidebending WFL WFL       Lumbar Rotation         Thoracic Flexion      Thoracic Extension      Thoracic Sidebending         Thoracic Rotation           Lower Extremity Strength:     Date:      LE strength/5 Right Left Right Left Right Left   Hip Flexion (L1-3) 5 5       Hip Extension (L5-S1)         Hip Abduction (L4-5)         Hip Adduction (L2-3)         Hip External Rotation         Hip Internal Rotation         Knee Extension (L3-4) 5 5       Knee Flexion         Ankle Dorsiflexion (L4-5) 5 5       Great Toe Extension (L5) 5 5       Ankle Plantar flexion (S1)         Abdominals        Sensation           Reflex Testing  Lumbar Dermatomes Right Left UE Reflexes Right Left   Iliac Crest and Groin (L1)   Biceps (C5-6)     Anterior Medial Thigh (L2)   Brachioradialis (C5-6)     Anterior Thigh, Medial Epicondyle Femur (L3)   Triceps (C7-8)     Lateral Thigh, Anterior Knee, Medial Leg/Malleolus (L4)    Omi s test     Lateral Leg, Dorsal Foot (L5)   LE Reflexes     Lateral Foot (S1)   Patellar (L3-4) WNL WNL   Posterior Leg (S2)   Achilles (S1-2) WNL WNL   Other:   Babinski Response         Lumbar Special Tests:     Lumbar Special Tests Right Left SI Tests Right  Left   Quadrant test   SI Compression     Straight leg raise 70 68 SI Distraction     Crossover response   POSH Test     Slump neg neg Sacral Thrust     Sit-up test  FADIR Mod tightness WFL   Trunk extensor endurance test  TATA Mod tightness Mod tightness   Prone instability test  Resisted Abduction     Pubic shotgun  Other:       LE Screen/flexibility:  Hip IR  (R) 30     (L) 28  Hip ER (R) mild tightness   (L) WFL    Palpation:mild/mod tenderness (R) ant/med tibia. No tenderness of lumbar paraspinals, SI joints, post or lat femur.    Passive Mobility - Joint Integrity:  No pain with gr. I PA pressures to lumbar spine .    Treatment Today     TREATMENT MINUTES COMMENTS   Evaluation 35    Self-care/ Home management     Manual therapy 10 Induction, indirect, direct techniques utilized as appropriate for optimal tissue release.   MFR/SCS - (R) SMEDTL2-3-MS, supine LS traction   Neuromuscular Re-education     Therapeutic Activity     Therapeutic Exercises 10 Plan of care and goals developed in collaboration with patient.   Discussed findings, anatomy, instructed in exercises.  Exercises per flow sheet.    Gait training     Modality__________________                Total 55    Blank areas are intentional and mean the treatment did not include these items.     PT Evaluation Code: (Please list factors)  Patient History/Comorbidities: osteopenia  Examination: 3  Clinical Presentation: evolving  Clinical Decision Making: mod    Patient History/  Comorbidities Examination  (body structures and functions, activity limitations, and/or participation restrictions) Clinical Presentation Clinical Decision Making (Complexity)   No documented Comorbidities or  personal factors 1-2 Elements Stable and/or uncomplicated Low   1-2 documented comorbidities or personal factor 3 Elements Evolving clinical presentation with changing characteristics Moderate   3-4 documented comorbidities or personal factors 4 or more Unstable and unpredictable High                Stephanie SHAHID Hudson  10/1/2018  1:59 PM

## 2021-06-20 NOTE — PROGRESS NOTES
[facility] Daily Progress     Patient Name: Sharyn Mcfarlane  Date: 10/4/2018  Visit #: 2  PTA visit #:  1  Referral Diagnosis: right leg pain  Referring provider: Rizwan Saez MD  Visit Diagnosis:     ICD-10-CM    1. Right-sided low back pain with right-sided sciatica, unspecified chronicity M54.41    2. Right leg pain M79.604    3. Osteopenia M85.80    4. Chronic neck pain M54.2     G89.29    5. Decreased ROM of neck R29.898    6. Abnormal posture R29.3          Assessment:     Cues for HEP/posture  Complaint with HEP  Pt would benefit with continuing skilled physical thearpy treatments    Goal Status: Ongoing  Pt. will demonstrate/verbalize independence in self-management of condition in : 6 weeks  Pt. will be independent with home exercise program in : 6 weeks  Pt. will have improved quality of sleep: waking less times/night;in 6 weeks;Comment  Comment:: waking 1-2x/night  Pt. will be able to walk : 20 minutes;for household mobility;for community mobility;with less pain;with less difficulty;in 6 weeks  Patient will sit: 30 minutes;for eating;for watching TV;with less pain;with less difficultty;in 6 weeks  Patient will transfer: sit/stand;supine/sit;for in/out of bed;for in/out of chair;with less pain;with less difficulty;in 6 weeks  No Data Recorded    Plan / Patient Education:     Continue POC  Progress ex as able  MT as needed    Subjective:     Pain Ratin/10 intermittent   Sleeping is better  Medications as needed   Ex 2x/day  Walking to walk dog each day         Objective:     Reviewed HEP/posture cues needed  Added bridging, prone glut sets, press-ups, and knee bends  MFR to lumbar paraspinals       Treatment Today   10/4/2018    TREATMENT MINUTES COMMENTS   Evaluation     Self-care/ Home management     Manual therapy 5 MFR to lumbar paraspinals    Neuromuscular Re-education     Therapeutic Activity     Therapeutic Exercises 20 Ex per flow sheet   Gait training      Modality__________________                Total     Blank areas are intentional and mean the treatment did not include these items.       Poornima Maddox  10/4/2018

## 2021-06-21 NOTE — PROGRESS NOTES
"[facility] Daily Progress     Patient Name: Sharyn Mcfarlane  Date: 10/29/2018  Visit #:   PTA visit #: 2   Referral Diagnosis: Right leg pain  Referring provider: Rizwan Saez MD  Visit Diagnosis:     ICD-10-CM    1. Right leg pain M79.604    2. Osteopenia M85.80    3. Right-sided low back pain with right-sided sciatica, unspecified chronicity M54.41          Assessment:     HEP/POC compliance is  good .  Patient demonstrates understanding/independence with home program.  Response to Intervention good    Goal Status:  Pt. will demonstrate/verbalize independence in self-management of condition in : 6 weeks;Met  Pt. will be independent with home exercise program in : 6 weeks;Met  Pt. will have improved quality of sleep: waking less times/night;in 6 weeks;Comment;Met  Comment:: waking 1-2x/night  Pt. will be able to walk : 20 minutes;for household mobility;for community mobility;with less pain;with less difficulty;in 6 weeks;Met  Patient will sit: 30 minutes;for eating;for watching TV;with less pain;with less difficultty;in 6 weeks;Comment;Met  Comment: status: tolerance 30-60 min  Patient will transfer: sit/stand;supine/sit;for in/out of bed;for in/out of chair;with less pain;with less difficulty;in 6 weeks;Met  No Data Recorded    Plan / Patient Education:     No further PT scheduled. Patient is independent with home program.   Hold chart 30 days, then DC if patient doesn't return.     Subjective:     Pain Ratin  Noted transient sharp (B) LBP this morning when walking. Otherwise, has done well the past week. Occasional dull pain \"every once in a while\".     Modified Oswestry Low Back Pain Disablity Questionnaire  in %: 6    Objective:     Periosteal scan (+) (R) lumbar.   Restrictions noted in (R) hamstring tender points.     Trunk ROM: flex WNL  Ext 75%    Treatment Today     TREATMENT MINUTES COMMENTS   Evaluation     Self-care/ Home management     Manual therapy 30 Induction, indirect, " direct techniques utilized as appropriate for optimal tissue release.   MFR/SCS - (R) L2-4F(P), (R) lumbar plexus, supine LS traction, (R) sciatic nn release   Neuromuscular Re-education     Therapeutic Activity     Therapeutic Exercises     Gait training     Modality__________________                Total 30    Blank areas are intentional and mean the treatment did not include these items.       Stephanie Treviño  10/29/2018    [facility] Discharge Summary  Patient Name: Sharyn Mcfarlane  Date: 11/27/2018  Referral Diagnosis: Right leg pain  Referring provider: Rizwan Saez MD  Visit Diagnosis:   1. Right leg pain     2. Osteopenia     3. Right-sided low back pain with right-sided sciatica, unspecified chronicity         Goals:  Pt. will demonstrate/verbalize independence in self-management of condition in : 6 weeks;Met  Pt. will be independent with home exercise program in : 6 weeks;Met  Pt. will have improved quality of sleep: waking less times/night;in 6 weeks;Comment;Met  Comment:: waking 1-2x/night  Pt. will be able to walk : 20 minutes;for household mobility;for community mobility;with less pain;with less difficulty;in 6 weeks;Met  Patient will sit: 30 minutes;for eating;for watching TV;with less pain;with less difficultty;in 6 weeks;Comment;Met  Comment: status: tolerance 30-60 min  Patient will transfer: sit/stand;supine/sit;for in/out of bed;for in/out of chair;with less pain;with less difficulty;in 6 weeks;Met    No Data Recorded    Patient was seen for 7 visits from 10/1/18 to 10/29/18 with no missed appointments.  The patient met goals and has demonstrated understanding of and independence in the home program for self-care, and progression to next steps.  She will initiate contact if questions or concerns arise.  The patient reports feeling better and did not wish to schedule further therapy at this time.  Patient received a home program for strength, mobility.  No further therapy is  required at this time.    Therapy will be discontinued at this time.  The patient will need a new referral to resume.    Thank you for your referral.  Stephanie Treviño  11/27/2018  11:43 AM

## 2021-06-21 NOTE — PROGRESS NOTES
"[facility] Daily Progress     Patient Name: Sharyn Mcfarlane  Date: 10/12/2018  Visit #:   PTA visit #: 1   Referral Diagnosis: Right leg pain  Referring provider: Rizwan Saez MD  Visit Diagnosis:     ICD-10-CM    1. Right-sided low back pain with right-sided sciatica, unspecified chronicity M54.41    2. Right leg pain M79.604    3. Osteopenia M85.80          Assessment:     HEP/POC compliance is  good .  Patient demonstrates understanding/independence with home program.  Response to Intervention good  Patient is benefitting from skilled physical therapy and is making steady progress toward functional goals.  Patient is appropriate to continue with skilled physical therapy intervention, as indicated by initial plan of care.    Goal Status:  Pt. will demonstrate/verbalize independence in self-management of condition in : 6 weeks;Progressing toward  Pt. will be independent with home exercise program in : 6 weeks;Progressing toward  Pt. will have improved quality of sleep: waking less times/night;in 6 weeks;Comment;Met  Comment:: waking 1-2x/night  Pt. will be able to walk : 20 minutes;for household mobility;for community mobility;with less pain;with less difficulty;in 6 weeks;Met  Patient will sit: 30 minutes;for eating;for watching TV;with less pain;with less difficultty;in 6 weeks;Comment;Met  Comment: status: tolerance 30-60 min  Patient will transfer: sit/stand;supine/sit;for in/out of bed;for in/out of chair;with less pain;with less difficulty;in 6 weeks;Met  No Data Recorded    Plan / Patient Education:     Continue with initial plan of care.  Progress with home program as tolerated.    Subjective:     Pain Ratin  Pretty good. No pain this morning. Minimal complaints since the last session, \"better than other weeks\".       Objective:     Mod tenderness (R) lat femur.  (R) PSIS post in standing.       Treatment Today     TREATMENT MINUTES COMMENTS   Evaluation     Self-care/ Home " management     Manual therapy 25 Induction, indirect, direct techniques utilized as appropriate for optimal tissue release.   MFR/SCS - supine LS traction, iliac gap, (R) hemipelvis,  (R) EPIL-LV/mult pts,  (R) UPL5   Neuromuscular Re-education     Therapeutic Activity     Therapeutic Exercises  5 nc Rev selected HEP.   Gait training     Modality__________________                Total 30    Blank areas are intentional and mean the treatment did not include these items.       Stephanie Treviño  10/12/2018

## 2021-06-21 NOTE — PROGRESS NOTES
[facility] Daily Progress     Patient Name: Sharyn Mcfarlane  Date: 10/17/2018  Visit #:   PTA visit #: 2   Referral Diagnosis: Right leg pain  Referring provider: Rizwan Saez MD  Visit Diagnosis:     ICD-10-CM    1. Right leg pain M79.604    2. Osteopenia M85.80    3. Right-sided low back pain with right-sided sciatica, unspecified chronicity M54.41    4. Chronic neck pain M54.2     G89.29    5. Decreased ROM of neck R29.898    6. Abnormal posture R29.3          Assessment:   Reports decreased pain  Sleeping is good  Transfers without pain  Goals nearing  HEP/POC compliance is  good .  Patient demonstrates understanding/independence with home program.  Response to Intervention good  Patient is benefitting from skilled physical therapy and is making steady progress toward functional goals.  Patient is appropriate to continue with skilled physical therapy intervention, as indicated by initial plan of care.    Goal Status:  Pt. will demonstrate/verbalize independence in self-management of condition in : Met  Pt. will be independent with home exercise program in : Met  Pt. will have improved quality of sleep: Met  Comment:: waking 1-2x/night  Pt. will be able to walk : 20 minutes  Patient will sit: Met  Comment: status: tolerance 30-60 min  Patient will transfer: Met  No Data Recorded    Plan / Patient Education:     Continue with initial plan of care.  Progress with home program as tolerated.   Continue x2    Subjective:     Pain Ratin pt reports some left back pain when starting her walk on Monday but better now  Sleep normal  Walking a mile  Sitting good in firm chairs, discomfort in recliner   Transfers are good       Objective:     Reviewed HEP  Added bridging with knee ext, and dead bug  Continued MT  Goals nearing      Treatment Today     TREATMENT MINUTES COMMENTS   Evaluation     Self-care/ Home management     Manual therapy 15 Induction, indirect, direct techniques utilized as  appropriate for optimal tissue release.   MFR/SCS - supine LS traction,    Neuromuscular Re-education     Therapeutic Activity     Therapeutic Exercises  10 Rev selected HEP.   Gait training     Modality__________________                Total 25    Blank areas are intentional and mean the treatment did not include these items.       Poornima Maddox  10/17/2018

## 2021-06-21 NOTE — PROGRESS NOTES
[facility] Daily Progress     Patient Name: Sharyn Mcfarlane  Date: 10/22/2018  Visit #:   PTA visit #: 2   Referral Diagnosis: Right leg pain  Referring provider: Rizwan Saez MD  Visit Diagnosis:     ICD-10-CM    1. Right leg pain M79.604    2. Osteopenia M85.80    3. Right-sided low back pain with right-sided sciatica, unspecified chronicity M54.41          Assessment:     HEP/POC compliance is  good .  Patient demonstrates understanding/independence with home program.  Response to Intervention good  Patient is benefitting from skilled physical therapy and is making steady progress toward functional goals.  Patient is appropriate to continue with skilled physical therapy intervention, as indicated by initial plan of care.    Goal Status:  Pt. will demonstrate/verbalize independence in self-management of condition in : Met  Pt. will be independent with home exercise program in : Met  Pt. will have improved quality of sleep: Met  Comment:: waking 1-2x/night  Pt. will be able to walk : 20 minutes  Patient will sit: Met  Comment: status: tolerance 30-60 min  Patient will transfer: Met  No Data Recorded    Plan / Patient Education:     Continue with initial plan of care.  Progress with home program as tolerated.  cont x 1 visit.     Subjective:     Pain Ratin  Just a little stiff from prolonged standing at a meeting this morning[place]. A little back stiffness for the 1st block or so. Feeling comfortable with the exercises.       Objective:     Lumbar ROM:  Date:      *Indicate scale AROM AROM AROM   Lumbar Flexion WNL     Lumbar Extension 75%      Right Left Right Left Right Left   Lumbar Sidebending WNL WNL       Lumbar Rotation         Thoracic Flexion      Thoracic Extension      Thoracic Sidebending         Thoracic Rotation               Treatment Today     TREATMENT MINUTES COMMENTS   Evaluation     Self-care/ Home management     Manual therapy 25  Induction, indirect, direct techniques utilized as appropriate for optimal tissue release.   MFR/SCS - supine LS traction, (R) hemipelvis, (R) psoas, sacral release   Neuromuscular Re-education     Therapeutic Activity     Therapeutic Exercises     Gait training     Modality__________________                Total 25    Blank areas are intentional and mean the treatment did not include these items.       Stephanie Treviño  10/22/2018

## 2021-07-13 ENCOUNTER — RECORDS - HEALTHEAST (OUTPATIENT)
Dept: ADMINISTRATIVE | Facility: CLINIC | Age: 66
End: 2021-07-13

## 2021-07-21 ENCOUNTER — RECORDS - HEALTHEAST (OUTPATIENT)
Dept: ADMINISTRATIVE | Facility: CLINIC | Age: 66
End: 2021-07-21

## 2021-08-10 ENCOUNTER — TRANSFERRED RECORDS (OUTPATIENT)
Dept: HEALTH INFORMATION MANAGEMENT | Facility: CLINIC | Age: 66
End: 2021-08-10

## 2021-10-04 ENCOUNTER — HEALTH MAINTENANCE LETTER (OUTPATIENT)
Age: 66
End: 2021-10-04

## 2021-10-13 ASSESSMENT — ACTIVITIES OF DAILY LIVING (ADL): CURRENT_FUNCTION: NO ASSISTANCE NEEDED

## 2021-10-18 ENCOUNTER — OFFICE VISIT (OUTPATIENT)
Dept: FAMILY MEDICINE | Facility: CLINIC | Age: 66
End: 2021-10-18
Payer: COMMERCIAL

## 2021-10-18 VITALS
DIASTOLIC BLOOD PRESSURE: 72 MMHG | BODY MASS INDEX: 23.66 KG/M2 | RESPIRATION RATE: 16 BRPM | SYSTOLIC BLOOD PRESSURE: 124 MMHG | HEIGHT: 64 IN | OXYGEN SATURATION: 98 % | WEIGHT: 138.6 LBS | HEART RATE: 70 BPM

## 2021-10-18 DIAGNOSIS — R73.01 IMPAIRED FASTING GLUCOSE: ICD-10-CM

## 2021-10-18 DIAGNOSIS — Z13.0 SCREENING FOR DEFICIENCY ANEMIA: ICD-10-CM

## 2021-10-18 DIAGNOSIS — Z00.00 ENCOUNTER FOR MEDICARE ANNUAL WELLNESS EXAM: Primary | ICD-10-CM

## 2021-10-18 DIAGNOSIS — Z12.31 ENCOUNTER FOR SCREENING MAMMOGRAM FOR BREAST CANCER: ICD-10-CM

## 2021-10-18 DIAGNOSIS — Z13.220 SCREENING FOR CHOLESTEROL LEVEL: ICD-10-CM

## 2021-10-18 DIAGNOSIS — K13.0 CHEILITIS: ICD-10-CM

## 2021-10-18 DIAGNOSIS — R79.9 ABNORMAL FINDING OF BLOOD CHEMISTRY, UNSPECIFIED: ICD-10-CM

## 2021-10-18 DIAGNOSIS — Z13.1 SCREENING FOR DIABETES MELLITUS: ICD-10-CM

## 2021-10-18 DIAGNOSIS — Z23 ENCOUNTER FOR IMMUNIZATION: ICD-10-CM

## 2021-10-18 DIAGNOSIS — M81.0 AGE-RELATED OSTEOPOROSIS WITHOUT CURRENT PATHOLOGICAL FRACTURE: ICD-10-CM

## 2021-10-18 LAB
ANION GAP SERPL CALCULATED.3IONS-SCNC: 9 MMOL/L (ref 5–18)
BUN SERPL-MCNC: 9 MG/DL (ref 8–22)
CALCIUM SERPL-MCNC: 9.9 MG/DL (ref 8.5–10.5)
CHLORIDE BLD-SCNC: 104 MMOL/L (ref 98–107)
CHOLEST SERPL-MCNC: 246 MG/DL
CO2 SERPL-SCNC: 28 MMOL/L (ref 22–31)
CREAT SERPL-MCNC: 0.75 MG/DL (ref 0.6–1.1)
FASTING STATUS PATIENT QL REPORTED: YES
FERRITIN SERPL-MCNC: 179 NG/ML (ref 10–130)
GFR SERPL CREATININE-BSD FRML MDRD: 83 ML/MIN/1.73M2
GLUCOSE BLD-MCNC: 103 MG/DL (ref 70–125)
HDLC SERPL-MCNC: 91 MG/DL
HGB BLD-MCNC: 13.7 G/DL (ref 11.7–15.7)
LDLC SERPL CALC-MCNC: 137 MG/DL
POTASSIUM BLD-SCNC: 4.7 MMOL/L (ref 3.5–5)
SODIUM SERPL-SCNC: 141 MMOL/L (ref 136–145)
TRIGL SERPL-MCNC: 89 MG/DL
VIT B12 SERPL-MCNC: 632 PG/ML (ref 213–816)

## 2021-10-18 PROCEDURE — 36415 COLL VENOUS BLD VENIPUNCTURE: CPT | Performed by: NURSE PRACTITIONER

## 2021-10-18 PROCEDURE — 82728 ASSAY OF FERRITIN: CPT | Performed by: NURSE PRACTITIONER

## 2021-10-18 PROCEDURE — 99214 OFFICE O/P EST MOD 30 MIN: CPT | Mod: 25 | Performed by: NURSE PRACTITIONER

## 2021-10-18 PROCEDURE — 99397 PER PM REEVAL EST PAT 65+ YR: CPT | Mod: 25 | Performed by: NURSE PRACTITIONER

## 2021-10-18 PROCEDURE — 80048 BASIC METABOLIC PNL TOTAL CA: CPT | Performed by: NURSE PRACTITIONER

## 2021-10-18 PROCEDURE — 90662 IIV NO PRSV INCREASED AG IM: CPT | Performed by: NURSE PRACTITIONER

## 2021-10-18 PROCEDURE — 83036 HEMOGLOBIN GLYCOSYLATED A1C: CPT | Performed by: NURSE PRACTITIONER

## 2021-10-18 PROCEDURE — 84207 ASSAY OF VITAMIN B-6: CPT | Mod: 90 | Performed by: NURSE PRACTITIONER

## 2021-10-18 PROCEDURE — 85018 HEMOGLOBIN: CPT | Performed by: NURSE PRACTITIONER

## 2021-10-18 PROCEDURE — G0008 ADMIN INFLUENZA VIRUS VAC: HCPCS | Performed by: NURSE PRACTITIONER

## 2021-10-18 PROCEDURE — 82306 VITAMIN D 25 HYDROXY: CPT | Performed by: NURSE PRACTITIONER

## 2021-10-18 PROCEDURE — 99000 SPECIMEN HANDLING OFFICE-LAB: CPT | Performed by: NURSE PRACTITIONER

## 2021-10-18 PROCEDURE — 80061 LIPID PANEL: CPT | Performed by: NURSE PRACTITIONER

## 2021-10-18 PROCEDURE — 82607 VITAMIN B-12: CPT | Performed by: NURSE PRACTITIONER

## 2021-10-18 RX ORDER — ALENDRONATE SODIUM 70 MG/1
70 TABLET ORAL
Qty: 12 TABLET | Refills: 3 | Status: SHIPPED | OUTPATIENT
Start: 2021-10-18

## 2021-10-18 RX ORDER — TRIAMCINOLONE ACETONIDE 0.25 MG/G
OINTMENT TOPICAL 2 TIMES DAILY
Qty: 15 G | Refills: 0 | Status: SHIPPED | OUTPATIENT
Start: 2021-10-18

## 2021-10-18 ASSESSMENT — MIFFLIN-ST. JEOR: SCORE: 1144.72

## 2021-10-18 ASSESSMENT — ACTIVITIES OF DAILY LIVING (ADL): CURRENT_FUNCTION: NO ASSISTANCE NEEDED

## 2021-10-18 NOTE — PROGRESS NOTES
"SUBJECTIVE:   Sharyn Mcfarlane is a 66 year old female who presents for Preventive Visit.    She has been well.  No major changes to health.  New grandson who is five months old, watching him once a week.      Osteoporosis:  Restarted Fosamax last year after decline in BMD on drug holiday.  Tolerating the medication well.  Exercising most days, [facility] [employer] Vitamin D, calcium    Burning lips:  Has been occurring for several months.  States lips feel hot and red and then peel.  Occurs daily.  Using Chapstick with SPF.  No new products.      Patient has been advised of split billing requirements and indicates understanding: Yes   Are you in the first 12 months of your Medicare coverage?  No    Healthy Habits:     In general, how would you rate your overall health?  Excellent    Frequency of exercise:  6-7 days/week    Duration of exercise:  45-60 minutes    Do you usually eat at least 4 servings of fruit and vegetables a day, include whole grains    & fiber and avoid regularly eating high fat or \"junk\" foods?  Yes    Taking medications regularly:  Yes    Medication side effects:  Not applicable    Ability to successfully perform activities of daily living:  No assistance needed    Home Safety:  No safety concerns identified    Hearing Impairment:  No hearing concerns    In the past 6 months, have you been bothered by leaking of urine? Yes    In general, how would you rate your overall mental or emotional health?  Excellent      PHQ-2 Total Score: 0    Additional concerns today:  Yes    Do you feel safe in your environment? Yes    Have you ever done Advance Care Planning? (For example, a Health Directive, POLST, or a discussion with a medical provider or your loved ones about your wishes): No, advance care planning information given to patient to review.  Patient plans to discuss their wishes with loved ones or provider.         Fall risk  Fallen 2 or more times in the past year?: No  Any fall with injury in " the past year?: No  click delete button to remove this line now  Cognitive Screening   1) Repeat 3 items (Leader, Season, Table)    2) Clock draw: NORMAL  3) 3 item recall: Recalls 3 objects  Results: 3 items recalled: COGNITIVE IMPAIRMENT LESS LIKELY    Mini-CogTM Copyright JULISSA Melo. Licensed by the author for use in [facility]; reprinted with permission (pinky@[facility]). All rights reserved.      Do you have sleep apnea, excessive snoring or daytime drowsiness?: no    Reviewed and updated as needed this visit by clinical staff    Meds              Reviewed and updated as needed this visit by Provider                Social History     Tobacco Use     Smoking status: Former Smoker     Smokeless tobacco: Never Used   Substance Use Topics     Alcohol use: Yes     Alcohol/week: 5.8 standard drinks     If you drink alcohol do you typically have >3 drinks per day or >7 drinks per week? No    Alcohol Use 10/13/2021   Prescreen: >3 drinks/day or >7 drinks/week? No   No flowsheet data found.            Current providers sharing in care for this patient include:     Patient Care Team:  Vandana Warren CNP as PCP - General (Nurse Practitioner)  Cynthia Cobb DO as Assigned PCP    The following health maintenance items are reviewed in Epic and correct as of today:  Health Maintenance Due   Topic Date Due     ANNUAL REVIEW OF HM ORDERS  Never done     INFLUENZA VACCINE (1) 09/01/2021     FALL RISK ASSESSMENT  10/01/2021     MEDICARE ANNUAL WELLNESS VISIT  10/01/2021       Mammogram Screening: Mammogram Screening: Recommended mammography every 1-2 years with patient discussion and risk factor consideration  Any new diagnosis of family breast, ovarian, or bowel cancer? No    FHS-7: No flowsheet data found.    Mammogram Screening: Recommended mammography every 1-2 years with patient discussion and risk factor consideration  Pertinent mammograms are reviewed under the imaging tab.    Review of  "Systems  Constitutional, HEENT, cardiovascular, pulmonary, gi and gu systems are negative, except as otherwise noted.    OBJECTIVE:   There were no vitals taken for this visit. Estimated body mass index is 24.26 kg/m  as calculated from the following:    Height as of 10/1/20: 1.626 m (5' 4\").    Weight as of 1/12/21: 64.1 kg (141 lb 5 oz).  Physical Exam  GENERAL: healthy, alert and no distress  EYES: Eyes grossly normal to inspection, PERRL and conjunctivae and sclerae normal  HENT: ear canals and TM's normal, nose and mouth without ulcers or lesions  NECK: no adenopathy, no asymmetry, masses, or scars and thyroid normal to palpation  RESP: lungs clear to auscultation - no rales, rhonchi or wheezes  BREAST: normal without masses, tenderness or nipple discharge and no palpable axillary masses or adenopathy  CV: regular rate and rhythm, normal S1 S2, no S3 or S4, no murmur, click or rub, no peripheral edema and peripheral pulses strong  ABDOMEN: soft, nontender, no hepatosplenomegaly, no masses and bowel sounds normal  MS: no gross musculoskeletal defects noted, no edema  SKIN: no suspicious lesions or rashes  NEURO: Normal strength and tone, mentation intact and speech normal  PSYCH: mentation appears normal, affect normal/bright    Diagnostic Test Results:  Labs reviewed in Epic    ASSESSMENT / PLAN:   1. Encounter for Medicare annual wellness exam  Healthy female.  Continues annual mammography d/t H ovarian cancer in mother.  Routine screening for dermatology d/t skin cancer history.. Up to date on immunizations, CRC screening.      2. Age-related osteoporosis without current pathological fracture  Resumed Fosamax one year ago due to decline in BMD on drug holiday. Tolerating well. Plan for repeat DEXA in one year.   - alendronate (FOSAMAX) 70 MG tablet; Take 1 tablet (70 mg) by mouth every 7 days  Dispense: 12 tablet; Refill: 3  - Vitamin D Deficiency; Future  - Basic metabolic panel  (Ca, Cl, CO2, Creat, Gluc, " "K, Na, BUN); Future    3. Cheilitis  Discussed atopic cheilitis versus allergic/contact cheilitis.  Will treat with topical corticosteroid. Recommended discontinuing lip product and switching to Vaseline.  No other erythema or lesions noted in mouth.    - Vitamin B6; Future  - Vitamin B12; Future  - Ferritin; Future  - triamcinolone (KENALOG) 0.025 % external ointment; Apply topically 2 times daily  Dispense: 15 g; Refill: 0    4. Screening for cholesterol level  - Lipid Profile (Chol, Trig, HDL, LDL calc); Future    5. Screening for diabetes mellitus  - Basic metabolic panel  (Ca, Cl, CO2, Creat, Gluc, K, Na, BUN); Future    6. Screening for deficiency anemia  - Hemoglobin; Future    7. Abnormal finding of blood chemistry, unspecified   - Ferritin; Future    8. Encounter for screening mammogram for breast cancer  - *MA Screening Digital Bilateral; Future    9. Encounter for immunization     Patient has been advised of split billing requirements and indicates understanding: Yes  COUNSELING:  Reviewed preventive health counseling, as reflected in patient instructions    Estimated body mass index is 24.26 kg/m  as calculated from the following:    Height as of 10/1/20: 1.626 m (5' 4\").    Weight as of 1/12/21: 64.1 kg (141 lb 5 oz).        She reports that she has quit smoking. She has never used smokeless tobacco.      Appropriate preventive services were discussed with this patient, including applicable screening as appropriate for cardiovascular disease, diabetes, osteopenia/osteoporosis, and glaucoma.  As appropriate for age/gender, discussed screening for colorectal cancer, prostate cancer, breast cancer, and cervical cancer. Checklist reviewing preventive services available has been given to the patient.    Reviewed patients plan of care and provided an AVS. The Basic Care Plan (routine screening as documented in Health Maintenance) for Sharyn meets the Care Plan requirement. This Care Plan has been established " and reviewed with the Patient.    Counseling Resources:  ATP IV Guidelines  Pooled Cohorts Equation Calculator  Breast Cancer Risk Calculator  Breast Cancer: Medication to Reduce Risk  FRAX Risk Assessment  ICSI Preventive Guidelines  Dietary Guidelines for Americans, 2010  [employer]'s MyPlate  ASA Prophylaxis  Lung CA Screening    Vandana Warren CNP  [facility]    Identified Health Risks:    Information on urinary incontinence and treatment options given to patient.

## 2021-10-18 NOTE — PATIENT INSTRUCTIONS
Patient Education   Personalized Prevention Plan  You are due for the preventive services outlined below.  Your care team is available to assist you in scheduling these services.  If you have already completed any of these items, please share that information with your care team to update in your medical record.  Health Maintenance Due   Topic Date Due     Flu Vaccine (1) 09/01/2021     FALL RISK ASSESSMENT  10/01/2021       Urinary Incontinence, Female (Adult)   Urinary incontinence means loss of bladder control. This problem affects many women, especially as they get older. If you have incontinence, you may be embarrassed to ask for help. But know that this problem can be treated.   Types of Incontinence  There are different types of incontinence. Two of the main types are described here. You can have more than one type.     Stress incontinence. With this type, urine leaks when pressure (stress) is put on the bladder. This may happen when you cough, sneeze, or laugh. Stress incontinence most often occurs because the pelvic floor muscles that support the bladder and urethra are weak. This can happen after pregnancy and vaginal childbirth or a hysterectomy. It can also be due to excess body weight or hormone changes.    Urge incontinence (also called overactive bladder). With this type, a sudden urge to urinate is felt often. This may happen even though there may not be much urine in the bladder. The need to urinate often during the night is common. Urge incontinence most often occurs because of bladder spasms. This may be due to bladder irritation or infection. Damage to bladder nerves or pelvic muscles, constipation, and certain medicines can also lead to urge incontinence.  Treatment depends on the cause. Further evaluation is needed to find the type you have. This will likely include an exam and certain tests. Based on the results, you and your healthcare provider can then plan treatment. Until a diagnosis is  made, the home care tips below can help ease symptoms.   Home care    Do pelvic floor muscle exercises, if they are prescribed. The pelvic floor muscles help support the bladder and urethra. Many women find that their symptoms improve when doing special exercises that strengthen these muscles. To do the exercises, contract the muscles you would use to stop your stream of urine. But do this when you re not urinating. Hold for 10 seconds, then relax. Repeat 10 to 20 times in a row, at least 3 times a day. Your healthcare provider may give you other instructions for how to do the exercises and how often.    Keep a bladder diary. This helps track how often and how much you urinate over a set period of time. Bring this diary with you to your next visit with the provider. The information can help your provider learn more about your bladder problem.    Lose weight, if advised to by your provider. Extra weight puts pressure on the bladder. Your provider can help you create a weight-loss plan that s right for you. This may include exercising more and making certain diet changes.    Don't have foods and drinks that may irritate the bladder. These can include alcohol and caffeinated drinks.    Quit smoking. Smoking and other tobacco use can lead to a long-term (chronic) cough that strains the pelvic floor muscles. Smoking may also damage the bladder and urethra. Talk with your provider about treatments or methods you can use to quit smoking.    If drinking large amounts of fluid makes you have symptoms, you may be advised to limit your fluid intake. You may also be advised to drink most of your fluids during the day and to limit fluids at night.    If you re worried about urine leakage or accidents, you may wear absorbent pads to catch urine. Change the pads often. This helps reduce discomfort. It may also reduce the risk of skin or bladder infections.    Follow-up care  Follow up with your healthcare provider, or as directed.  It may take some to find the right treatment for your problem. But healthy lifestyle changes can be made right away. These include such things as exercising on a regular basis, eating a healthy diet, losing weight (if needed), and quitting smoking. Your treatment plan may include special therapies or medicines. Certain procedures or surgery may also be options. Talk about any questions you have with your provider.   When to seek medical advice  Call the healthcare provider right away if any of these occur:    Fever of 100.4 F (38 C) or higher, or as directed by your provider    Bladder pain or fullness    Belly swelling    Nausea or vomiting    Back pain    Weakness, dizziness, or fainting  [employer] last reviewed this educational content on 1/1/2020 2000-2021 [employer]. All rights reserved. This information is not intended as a substitute for professional medical care. Always follow your healthcare professional's instructions.

## 2021-10-19 LAB
DEPRECATED CALCIDIOL+CALCIFEROL SERPL-MC: 38 UG/L (ref 30–80)
HBA1C MFR BLD: 5.5 % (ref 0–5.6)

## 2021-10-20 LAB — PYRIDOXAL PHOS SERPL-SCNC: 110.1 NMOL/L

## 2021-11-08 ENCOUNTER — ANCILLARY PROCEDURE (OUTPATIENT)
Dept: MAMMOGRAPHY | Facility: CLINIC | Age: 66
End: 2021-11-08
Attending: NURSE PRACTITIONER
Payer: COMMERCIAL

## 2021-11-08 DIAGNOSIS — Z12.31 ENCOUNTER FOR SCREENING MAMMOGRAM FOR BREAST CANCER: ICD-10-CM

## 2021-11-08 PROCEDURE — 77067 SCR MAMMO BI INCL CAD: CPT | Mod: TC | Performed by: RADIOLOGY

## 2021-11-08 PROCEDURE — 77063 BREAST TOMOSYNTHESIS BI: CPT | Mod: TC | Performed by: RADIOLOGY

## 2021-11-17 ENCOUNTER — IMMUNIZATION (OUTPATIENT)
Dept: NURSING | Facility: CLINIC | Age: 66
End: 2021-11-17
Payer: COMMERCIAL

## 2021-11-17 PROCEDURE — 0064A COVID-19,PF,MODERNA (18+ YRS BOOSTER .25ML): CPT

## 2021-11-17 PROCEDURE — 91306 COVID-19,PF,MODERNA (18+ YRS BOOSTER .25ML): CPT

## 2022-09-11 ENCOUNTER — HEALTH MAINTENANCE LETTER (OUTPATIENT)
Age: 67
End: 2022-09-11

## 2022-10-24 ENCOUNTER — LAB REQUISITION (OUTPATIENT)
Dept: LAB | Facility: CLINIC | Age: 67
End: 2022-10-24

## 2022-10-24 DIAGNOSIS — E78.2 MIXED HYPERLIPIDEMIA: ICD-10-CM

## 2022-10-24 DIAGNOSIS — M81.0 AGE-RELATED OSTEOPOROSIS WITHOUT CURRENT PATHOLOGICAL FRACTURE: ICD-10-CM

## 2022-10-24 LAB
ANION GAP SERPL CALCULATED.3IONS-SCNC: 11 MMOL/L (ref 7–15)
BUN SERPL-MCNC: 12.6 MG/DL (ref 8–23)
CALCIUM SERPL-MCNC: 9.7 MG/DL (ref 8.8–10.2)
CHLORIDE SERPL-SCNC: 101 MMOL/L (ref 98–107)
CHOLEST SERPL-MCNC: 228 MG/DL
CREAT SERPL-MCNC: 0.68 MG/DL (ref 0.51–0.95)
DEPRECATED HCO3 PLAS-SCNC: 28 MMOL/L (ref 22–29)
GFR SERPL CREATININE-BSD FRML MDRD: >90 ML/MIN/1.73M2
GLUCOSE SERPL-MCNC: 97 MG/DL (ref 70–99)
HDLC SERPL-MCNC: 84 MG/DL
LDLC SERPL CALC-MCNC: 129 MG/DL
NONHDLC SERPL-MCNC: 144 MG/DL
POTASSIUM SERPL-SCNC: 4.6 MMOL/L (ref 3.4–5.3)
SODIUM SERPL-SCNC: 140 MMOL/L (ref 136–145)
TRIGL SERPL-MCNC: 77 MG/DL

## 2022-10-24 PROCEDURE — 80061 LIPID PANEL: CPT | Performed by: FAMILY MEDICINE

## 2022-10-24 PROCEDURE — 82310 ASSAY OF CALCIUM: CPT | Performed by: FAMILY MEDICINE

## 2022-11-11 ENCOUNTER — ANCILLARY PROCEDURE (OUTPATIENT)
Dept: MAMMOGRAPHY | Facility: CLINIC | Age: 67
End: 2022-11-11
Attending: FAMILY MEDICINE
Payer: COMMERCIAL

## 2022-11-11 DIAGNOSIS — Z12.31 VISIT FOR SCREENING MAMMOGRAM: ICD-10-CM

## 2022-11-11 PROCEDURE — 77067 SCR MAMMO BI INCL CAD: CPT | Mod: TC | Performed by: RADIOLOGY

## 2022-11-11 PROCEDURE — 77063 BREAST TOMOSYNTHESIS BI: CPT | Mod: TC | Performed by: RADIOLOGY

## 2023-01-22 ENCOUNTER — HEALTH MAINTENANCE LETTER (OUTPATIENT)
Age: 68
End: 2023-01-22

## 2023-02-06 ENCOUNTER — ANCILLARY PROCEDURE (OUTPATIENT)
Dept: BONE DENSITY | Facility: CLINIC | Age: 68
End: 2023-02-06
Attending: FAMILY MEDICINE
Payer: COMMERCIAL

## 2023-02-06 DIAGNOSIS — M81.0 AGE-RELATED OSTEOPOROSIS WITHOUT CURRENT PATHOLOGICAL FRACTURE: ICD-10-CM

## 2023-02-06 PROCEDURE — 77080 DXA BONE DENSITY AXIAL: CPT | Mod: TC | Performed by: RADIOLOGY

## 2023-08-14 ENCOUNTER — TRANSFERRED RECORDS (OUTPATIENT)
Dept: HEALTH INFORMATION MANAGEMENT | Facility: CLINIC | Age: 68
End: 2023-08-14
Payer: COMMERCIAL

## 2023-09-04 NOTE — ADDENDUM NOTE
Addendum Note by David Saez MD at 9/28/2018  3:05 PM     Author: David Saez MD Service: -- Author Type: Physician    Filed: 9/28/2018  3:05 PM Encounter Date: 9/28/2018 Status: Signed    : David Saez MD (Physician)    Addended by: DAVID SAEZ on: 9/28/2018 03:05 PM        Modules accepted: Orders        
Never smoker

## 2023-11-13 ENCOUNTER — ANCILLARY PROCEDURE (OUTPATIENT)
Dept: MAMMOGRAPHY | Facility: CLINIC | Age: 68
End: 2023-11-13
Attending: FAMILY MEDICINE
Payer: COMMERCIAL

## 2023-11-13 DIAGNOSIS — Z12.31 VISIT FOR SCREENING MAMMOGRAM: ICD-10-CM

## 2023-11-13 PROCEDURE — 77067 SCR MAMMO BI INCL CAD: CPT | Mod: TC | Performed by: RADIOLOGY

## 2023-11-13 PROCEDURE — 77063 BREAST TOMOSYNTHESIS BI: CPT | Mod: TC | Performed by: RADIOLOGY

## 2024-02-24 ENCOUNTER — HEALTH MAINTENANCE LETTER (OUTPATIENT)
Age: 69
End: 2024-02-24

## 2024-08-15 ENCOUNTER — TRANSFERRED RECORDS (OUTPATIENT)
Dept: HEALTH INFORMATION MANAGEMENT | Facility: CLINIC | Age: 69
End: 2024-08-15
Payer: COMMERCIAL

## 2024-10-16 ENCOUNTER — TRANSFERRED RECORDS (OUTPATIENT)
Dept: HEALTH INFORMATION MANAGEMENT | Facility: CLINIC | Age: 69
End: 2024-10-16
Payer: COMMERCIAL

## 2024-11-27 ENCOUNTER — LAB REQUISITION (OUTPATIENT)
Dept: LAB | Facility: CLINIC | Age: 69
End: 2024-11-27

## 2024-11-27 ENCOUNTER — TRANSFERRED RECORDS (OUTPATIENT)
Dept: HEALTH INFORMATION MANAGEMENT | Facility: CLINIC | Age: 69
End: 2024-11-27

## 2024-11-27 DIAGNOSIS — M81.0 AGE-RELATED OSTEOPOROSIS WITHOUT CURRENT PATHOLOGICAL FRACTURE: ICD-10-CM

## 2024-11-27 DIAGNOSIS — Z13.220 ENCOUNTER FOR SCREENING FOR LIPOID DISORDERS: ICD-10-CM

## 2024-11-27 DIAGNOSIS — R32 UNSPECIFIED URINARY INCONTINENCE: ICD-10-CM

## 2024-11-27 PROCEDURE — 80048 BASIC METABOLIC PNL TOTAL CA: CPT | Performed by: FAMILY MEDICINE

## 2024-11-27 PROCEDURE — 80061 LIPID PANEL: CPT | Performed by: FAMILY MEDICINE

## 2024-11-28 LAB
ANION GAP SERPL CALCULATED.3IONS-SCNC: 12 MMOL/L (ref 7–15)
BUN SERPL-MCNC: 13.3 MG/DL (ref 8–23)
CALCIUM SERPL-MCNC: 9.4 MG/DL (ref 8.8–10.4)
CHLORIDE SERPL-SCNC: 100 MMOL/L (ref 98–107)
CHOLEST SERPL-MCNC: 226 MG/DL
CREAT SERPL-MCNC: 0.73 MG/DL (ref 0.51–0.95)
EGFRCR SERPLBLD CKD-EPI 2021: 88 ML/MIN/1.73M2
FASTING STATUS PATIENT QL REPORTED: ABNORMAL
FASTING STATUS PATIENT QL REPORTED: NORMAL
GLUCOSE SERPL-MCNC: 98 MG/DL (ref 70–99)
HCO3 SERPL-SCNC: 26 MMOL/L (ref 22–29)
HDLC SERPL-MCNC: 101 MG/DL
LDLC SERPL CALC-MCNC: 112 MG/DL
NONHDLC SERPL-MCNC: 125 MG/DL
POTASSIUM SERPL-SCNC: 4.5 MMOL/L (ref 3.4–5.3)
SODIUM SERPL-SCNC: 138 MMOL/L (ref 135–145)
TRIGL SERPL-MCNC: 67 MG/DL

## 2024-12-04 ENCOUNTER — ANCILLARY PROCEDURE (OUTPATIENT)
Dept: MAMMOGRAPHY | Facility: CLINIC | Age: 69
End: 2024-12-04
Payer: COMMERCIAL

## 2024-12-04 DIAGNOSIS — Z12.31 VISIT FOR SCREENING MAMMOGRAM: ICD-10-CM

## 2024-12-04 PROCEDURE — 77067 SCR MAMMO BI INCL CAD: CPT | Mod: TC | Performed by: RADIOLOGY

## 2024-12-04 PROCEDURE — 77063 BREAST TOMOSYNTHESIS BI: CPT | Mod: TC | Performed by: RADIOLOGY

## 2024-12-18 ENCOUNTER — TRANSFERRED RECORDS (OUTPATIENT)
Dept: HEALTH INFORMATION MANAGEMENT | Facility: CLINIC | Age: 69
End: 2024-12-18

## 2025-01-16 ENCOUNTER — TRANSFERRED RECORDS (OUTPATIENT)
Dept: HEALTH INFORMATION MANAGEMENT | Facility: CLINIC | Age: 70
End: 2025-01-16
Payer: COMMERCIAL

## 2025-02-19 ENCOUNTER — TRANSFERRED RECORDS (OUTPATIENT)
Dept: HEALTH INFORMATION MANAGEMENT | Facility: CLINIC | Age: 70
End: 2025-02-19

## 2025-02-19 ENCOUNTER — MEDICAL CORRESPONDENCE (OUTPATIENT)
Dept: HEALTH INFORMATION MANAGEMENT | Facility: CLINIC | Age: 70
End: 2025-02-19

## 2025-02-19 ENCOUNTER — ANCILLARY PROCEDURE (OUTPATIENT)
Dept: BONE DENSITY | Facility: CLINIC | Age: 70
End: 2025-02-19
Attending: FAMILY MEDICINE
Payer: COMMERCIAL

## 2025-02-19 DIAGNOSIS — M81.0 AGE-RELATED OSTEOPOROSIS WITHOUT CURRENT PATHOLOGICAL FRACTURE: ICD-10-CM

## 2025-02-19 PROCEDURE — 77080 DXA BONE DENSITY AXIAL: CPT | Mod: TC | Performed by: PHYSICIAN ASSISTANT

## 2025-02-19 PROCEDURE — 77091 TBS TECHL CALCULATION ONLY: CPT | Performed by: PHYSICIAN ASSISTANT

## 2025-02-20 ENCOUNTER — TELEPHONE (OUTPATIENT)
Dept: PULMONOLOGY | Facility: CLINIC | Age: 70
End: 2025-02-20
Payer: COMMERCIAL

## 2025-02-20 DIAGNOSIS — J44.9 COPD (CHRONIC OBSTRUCTIVE PULMONARY DISEASE) (H): Primary | ICD-10-CM

## 2025-02-20 NOTE — TELEPHONE ENCOUNTER
Lm for patient to schedule a PFT and New Pulmonary Consult appointment with one of our Pulmonary physicians. PFT and New Pulm can be scheduled the same day as long as the PFT is done before the appt with the provider.

## 2025-02-20 NOTE — TELEPHONE ENCOUNTER
Call back received from pt, appt scheduled for PFT's and to establish care 3/19/25 with Sybil Guillaume NP. Need orders placed for PFT's (pended).     Would also like to confirm if the results of pt's recent chest x-ray and CT scan were received? These were supposed to have been sent along with her other records that were scanned into her chart on 2/19/25, records would have come from [facility].

## 2025-03-03 ENCOUNTER — LAB REQUISITION (OUTPATIENT)
Dept: LAB | Facility: CLINIC | Age: 70
End: 2025-03-03

## 2025-03-03 DIAGNOSIS — R39.9 UNSPECIFIED SYMPTOMS AND SIGNS INVOLVING THE GENITOURINARY SYSTEM: ICD-10-CM

## 2025-03-03 PROCEDURE — 87186 SC STD MICRODIL/AGAR DIL: CPT | Performed by: PHYSICIAN ASSISTANT

## 2025-03-04 LAB — BACTERIA UR CULT: ABNORMAL

## 2025-03-09 ENCOUNTER — HEALTH MAINTENANCE LETTER (OUTPATIENT)
Age: 70
End: 2025-03-09

## 2025-03-19 ENCOUNTER — OFFICE VISIT (OUTPATIENT)
Dept: PULMONOLOGY | Facility: CLINIC | Age: 70
End: 2025-03-19
Payer: COMMERCIAL

## 2025-03-19 VITALS
OXYGEN SATURATION: 97 % | HEIGHT: 64 IN | BODY MASS INDEX: 24.38 KG/M2 | HEART RATE: 66 BPM | DIASTOLIC BLOOD PRESSURE: 72 MMHG | WEIGHT: 142.8 LBS | SYSTOLIC BLOOD PRESSURE: 116 MMHG

## 2025-03-19 DIAGNOSIS — R05.3 CHRONIC COUGH: Primary | ICD-10-CM

## 2025-03-19 DIAGNOSIS — J45.40 RAD (REACTIVE AIRWAY DISEASE), MODERATE PERSISTENT, UNCOMPLICATED: ICD-10-CM

## 2025-03-19 DIAGNOSIS — J44.9 COPD (CHRONIC OBSTRUCTIVE PULMONARY DISEASE) (H): ICD-10-CM

## 2025-03-19 DIAGNOSIS — J43.2 CENTRILOBULAR EMPHYSEMA (H): ICD-10-CM

## 2025-03-19 LAB
DLCOCOR-%PRED-PRE: 97 %
DLCOCOR-PRE: 18.3 ML/MIN/MMHG
DLCOUNC-%PRED-PRE: 97 %
DLCOUNC-PRE: 18.36 ML/MIN/MMHG
DLCOUNC-PRED: 18.82 ML/MIN/MMHG
ERV-%PRED-PRE: 35 %
ERV-PRE: 0.34 L
ERV-PRED: 0.97 L
EXPTIME-PRE: 7.33 SEC
FEF2575-%PRED-POST: 84 %
FEF2575-%PRED-PRE: 54 %
FEF2575-POST: 1.46 L/SEC
FEF2575-PRE: 0.95 L/SEC
FEF2575-PRED: 1.74 L/SEC
FEFMAX-%PRED-PRE: 66 %
FEFMAX-PRE: 3.69 L/SEC
FEFMAX-PRED: 5.54 L/SEC
FEV1-%PRED-PRE: 84 %
FEV1-PRE: 1.71 L
FEV1FEV6-PRE: 66 %
FEV1FEV6-PRED: 79 %
FEV1FVC-PRE: 65 %
FEV1FVC-PRED: 79 %
FEV1SVC-PRE: 54 %
FEV1SVC-PRED: 66 %
FIFMAX-PRE: 2.96 L/SEC
FRCPLETH-%PRED-PRE: 125 %
FRCPLETH-PRE: 3.6 L
FRCPLETH-PRED: 2.87 L
FVC-%PRED-PRE: 102 %
FVC-PRE: 2.64 L
FVC-PRED: 2.57 L
HGB BLD-MCNC: 13.5 G/DL
IC-%PRED-PRE: 130 %
IC-PRE: 2.53 L
IC-PRED: 1.94 L
RVPLETH-%PRED-PRE: 143 %
RVPLETH-PRE: 2.97 L
RVPLETH-PRED: 2.07 L
TLCPLETH-%PRED-PRE: 124 %
TLCPLETH-PRE: 6.12 L
TLCPLETH-PRED: 4.91 L
VA-%PRED-PRE: 107 %
VA-PRE: 4.86 L
VC-%PRED-PRE: 102 %
VC-PRE: 3.15 L
VC-PRED: 3.07 L

## 2025-03-19 PROCEDURE — 94729 DIFFUSING CAPACITY: CPT | Performed by: INTERNAL MEDICINE

## 2025-03-19 PROCEDURE — 94726 PLETHYSMOGRAPHY LUNG VOLUMES: CPT | Performed by: INTERNAL MEDICINE

## 2025-03-19 PROCEDURE — 99204 OFFICE O/P NEW MOD 45 MIN: CPT | Mod: 25 | Performed by: NURSE PRACTITIONER

## 2025-03-19 PROCEDURE — 85018 HEMOGLOBIN: CPT | Mod: QW

## 2025-03-19 PROCEDURE — 3078F DIAST BP <80 MM HG: CPT | Performed by: NURSE PRACTITIONER

## 2025-03-19 PROCEDURE — 3074F SYST BP LT 130 MM HG: CPT | Performed by: NURSE PRACTITIONER

## 2025-03-19 PROCEDURE — 94060 EVALUATION OF WHEEZING: CPT | Performed by: INTERNAL MEDICINE

## 2025-03-19 RX ORDER — ATORVASTATIN CALCIUM 10 MG/1
TABLET, FILM COATED ORAL
COMMUNITY
Start: 2024-12-26

## 2025-03-19 RX ORDER — BUDESONIDE AND FORMOTEROL FUMARATE DIHYDRATE 160; 4.5 UG/1; UG/1
2 AEROSOL RESPIRATORY (INHALATION) 2 TIMES DAILY
Qty: 10.2 G | Refills: 5 | Status: SHIPPED | OUTPATIENT
Start: 2025-03-19

## 2025-03-19 RX ORDER — VIBEGRON 75 MG/1
TABLET, FILM COATED ORAL
COMMUNITY
Start: 2024-12-06

## 2025-03-19 NOTE — PROGRESS NOTES
Pulmonary Clinic Consultation          Assessment/Plan:     69 year old female with a history of HLD, osteoporosis - presenting for evaluation of emphysema and chronic cough.      Chronic cough  Emphysema  Nicotine dependence, in remission  Former smoker with 24 pack year history, quit in 1996.  She presents today for evaluation of chronic cough for the past year, and also emphysema incidentally noted on cardiac CT.  Her cough started about one year ago when she had an URI.  She required prednisone + fluticasone inhaled to decrease the cough, but it never really went away.  No respiratory issues prior.  She does endorse some shortness of breath when she is on a walk and has to carry her daughters dog, otherwise none.  She did note wheezing during PFTs today, otherwise none prior.  Some mucous production, but it is clear.  She denies chronic sinus concerns or seasonal/environmental allergies.  No personal history or family history of asthma.  Cardiac CT in 12/2024 with mild centrilobular emphysema, otherwise normal lung parenchyma and airways.  Pulmonary function testing today with mild airway obstruction (FEV1 84%), significant bronchodilator response, air-trapping and hyperinflation, and normal diffusion capacity.   Her chronic cough may be post-viral, OR underlying reactive airways vs asthma, due to noted bronchodilator response.  She did response to ICS previously.     Plan:  - reviewed PFT results and chest images in detail with patient today.   - start Symbicort (budesonide/formoterol) 160/4.5, two inhalations BID, rinse/gargle after use.    - she is UTD with annual influenza vaccine, COVID booster, RSV vaccine, and pneumococcal vaccines.  - if no improvement with inhaler, order chest CT, could trial LAMA/LABA instead.   - Action plan: prednisone 40mg x5 days, + azithromycin x5 days.       Follow-up:  - one month    Sybil Guillaume CNP  Pulmonary Medicine  [facility]  [place]  [phone]       CC:     Cough, emphysema     HPI:     69 year old female with a history of HLD, osteoporosis - presenting for evaluation of emphysema and chronic cough.      Cough that 'never seems to go away'.   First noticed cough one year ago.  Started as an URI.    Ended up on an inhaler and steroids.   Fluticasone, did help cough some.  Used maybe a week or two.   Coughing in the morning, now more of it during the day.  Was dry at first, now productive.  Had cold a couple weeks ago, maybe phlegm from that.  Clear phlegm.   No wheezing until PFTs, heard wheezing during that.   When she walks dog and has to carry (15lbs), has some SOB with that.   No coughing at night.  Just wakes to go to bathroom, not waking up to cough or SOB.  No concerns with breathing when young.   No allergy symptoms.  No chronic sinus issues.   Smoking hx:  started at age 17, 1 PPD, quit 1996.        Medical records reviewed for this visit include PCP notes.     ROS:     A 12-system review was obtained and was negative with the exception of the symptoms endorsed in the HPI.       Medical history:       PMH:  Past Medical History:   Diagnosis Date    Carpal tunnel syndrome     Centrilobular emphysema (H)     Fibrocystic breast     Glaucoma        PSH:  Past Surgical History:   Procedure Laterality Date    GYN SURGERY      hysterectomy     HC REMOVAL OF TONSILS,<12 Y/O      Description: Tonsillectomy;  Recorded: 01/16/2009;    HYSTERECTOMY  1990    OOPHORECTOMY      IN LAP,DIAGNOSTIC ABDOMEN      Description: Laparoscopy (Diagnostic);  Recorded: 03/31/2011;    ZC TOTAL ABDOM HYSTERECTOMY      Description: Hysterectomy;  Recorded: 01/20/2010;  Comments: vaginal       Allergies:  No Known Allergies    Family Hx:  Family History   Problem Relation Age of Onset    Cancer Mother         skin    Cancer Father         parotid    Diabetes No family hx of     Coronary Artery Disease No family hx of     Ovarian Cancer Mother 65.00     Colon Cancer Father 88.00    Glaucoma Father     Heart Disease Paternal Grandfather     Diabetes Type 2  Maternal Uncle        Social Hx:  Social History     Socioeconomic History    Marital status:      Spouse name: Not on file    Number of children: Not on file    Years of education: Not on file    Highest education level: Not on file   Occupational History    Not on file   Tobacco Use    Smoking status: Former     Current packs/day: 0.00     Average packs/day: 1 pack/day for 24.0 years (24.0 ttl pk-yrs)     Types: Cigarettes     Start date:      Quit date:      Years since quittin.2     Passive exposure: Past    Smokeless tobacco: Never   Vaping Use    Vaping status: Never Used   Substance and Sexual Activity    Alcohol use: Yes     Alcohol/week: 5.8 standard drinks of alcohol    Drug use: No    Sexual activity: Yes     Partners: Male     Birth control/protection: Post-menopausal   Other Topics Concern    Not on file   Social History Narrative    ,  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      Social Drivers of Health     Financial Resource Strain: Not on File (2021)    Received from [facility]    Financial Resource Strain     Financial Resource Strain: 0   Food Insecurity: Not on File (2024)    Received from [facility]    Food Insecurity     Food: 0   Transportation Needs: Not on File (2021)    Received from [facility]    Transportation Needs     Transportation: 0   Physical Activity: Not on File (2021)    Received from [facility]    Physical Activity     Physical Activity: 0   Stress: Not on File (2021)    Received from [facility]    Stress     Stress: 0   Social Connections: Not on File (2024)    Received from [facility]    Social Connections     Connectedness: 0   Interpersonal Safety: Not on file   Housing Stability: Not on File (2021)    Received from [facility]    Housing Stability     Housin       Current Meds:  Current Outpatient  "Medications   Medication Sig Dispense Refill    alendronate (FOSAMAX) 70 MG tablet Take 1 tablet (70 mg) by mouth every 7 days 12 tablet 3    atorvastatin (LIPITOR) 10 MG tablet 1 tablet Orally Once a day*      budesonide-formoterol (SYMBICORT/BREYNA) 160-4.5 MCG/ACT Inhaler Inhale 2 puffs into the lungs 2 times daily. 10.2 g 5    calcium carbonate (OS-ARNIE) 600 mg (1,500 mg) tablet [CALCIUM CARBONATE (OS-ARNIE) 600 MG (1,500 MG) TABLET] Take 600 mg by mouth 2 (two) times a day with meals.      GEMTESA 75 MG TABS tablet Take 1 tablet every day by oral route.      latanoprost (XALATAN) 0.005 % ophthalmic solution [LATANOPROST (XALATAN) 0.005 % OPHTHALMIC SOLUTION]       multivitamin capsule [MULTIVITAMIN CAPSULE] Take 1 capsule by mouth daily.      FINACEA 15 % gel [FINACEA 15 % GEL]   11    triamcinolone (KENALOG) 0.025 % external ointment Apply topically 2 times daily 15 g 0          Physical Exam:     /72 (BP Location: Left arm, Patient Position: Sitting, Cuff Size: Adult Regular)   Pulse 66   Ht 1.621 m (5' 3.8\")   Wt 64.8 kg (142 lb 12.8 oz)   SpO2 97%   BMI 24.67 kg/m    Gen: adult female, appears in NAD  HEENT: clear conjunctivae, moist mucous membranes  CV: RRR, no M/G/R  Resp: CTAB, no focal crackles or wheezes.  Respirations even and unlabored.  On RA.   Skin: no apparent rashes on visible skin  Ext: no cyanosis, clubbing or edema  Neuro: alert and answering questions appropriately       Data:     Labs:  reviewed    Imaging studies:  I have personally reviewed all pertinent imaging studies and PFT results unless otherwise noted.    Recent Results (from the past 744 hours)   DX Bone Density    Narrative    EXAM: DX TBS AXIAL  LOCATION: [facility]  DATE: 2/19/2025    INDICATION: Age-related osteoporosis with Fosamax use, , postmenopausal, height loss.  DEMOGRAPHICS: Age- 69 years. Gender- Female. Menopausal status- Postmenopausal.  COMPARISON: 2/6/2023  TECHNIQUE: " Dual-energy x-ray absorptiometry (DXA) performed with routine technique.  Trabecular bone score (TBS) analysis performed.    FINDINGS:    DXA RESULTS  -Lumbar Spine: L1-L4: BMD: 0.917 g/cm2. T-score: -2.2. Z-score: -0.5. Degenerative change may artifactually increase BMD.  -RIGHT Hip Total: BMD: 0.849 g/cm2. T-score: -1.3. Z-score: 0.2.  -RIGHT Hip Femoral neck: BMD: 0.810 g/cm2. T-score: -1.6. Z-score: 0.1.  -LEFT Hip Total: BMD: 0.806 g/cm2. T-score: -1.6. Z-score: -0.1.  -LEFT Hip Femoral neck: BMD: 0.802 g/cm2. T-score: -1.7. Z-score: 0.0.    WHO T-SCORE CRITERIA  -Normal: T score at or above -1 SD  -Osteopenia: T score between -1 and -2.5 SD  -Osteoporosis: T score at or below -2.5 SD    The World Health Organization (WHO) criteria is applicable to perimenopausal females, postmenopausal females, and men aged 50 years or older.    TBS RESULTS  -Lumbar Spine L1-L4: TBS: 1.192. TBS T-score: -3.0.TBS Z-score: -0.7.    The TBS is a DXA derived measurement for fracture risk assessment, and reflects the structural condition of the bone microarchitecture. It can be used to adjust WHO Fracture Risk Assessment Tool (FRAX) probability of fracture in postmenopausal women and   older men. The calculated probabilities of fracture have been shown to be more accurate when computed with the TBS.    INTERVAL CHANGE  -There has been a 6.3% increase in lumbar spine BMD, which may be due to arthritic/sclerotic changes.  -There has been a 0.1% decrease in bilateral hip BMD.    FRACTURE RISK  -The FRAX risk calculator is not applicable due to medication that is used for treatment of osteopenia/osteoporosis or can otherwise affect bone mineral density.      Impression    IMPRESSION: Low bone density (OSTEOPENIA). T score meets the WHO criteria for low bone density (osteopenia) at one or more measured sites. The risk of osteoporotic fracture increases approximately two-fold for each standard deviation decrease in T-score.          Pulmonary Function Testing

## 2025-03-19 NOTE — LETTER
3/19/2025      Sharyn Mcfarlane  [address] [place] [address]      Dear Colleague,    Thank you for referring your patient, Sharyn Mcfarlane, to the [facility]. Please see a copy of my visit note below.     Pulmonary Clinic Consultation          Assessment/Plan:     69 year old female with a history of HLD, osteoporosis - presenting for evaluation of emphysema and chronic cough.      Chronic cough  Emphysema  Nicotine dependence, in remission  Former smoker with 24 pack year history, quit in 1996.  She presents today for evaluation of chronic cough for the past year, and also emphysema incidentally noted on cardiac CT.  Her cough started about one year ago when she had an URI.  She required prednisone + fluticasone inhaled to decrease the cough, but it never really went away.  No respiratory issues prior.  She does endorse some shortness of breath when she is on a walk and has to carry her daughters dog, otherwise none.  She did note wheezing during PFTs today, otherwise none prior.  Some mucous production, but it is clear.  She denies chronic sinus concerns or seasonal/environmental allergies.  No personal history or family history of asthma.  Cardiac CT in 12/2024 with mild centrilobular emphysema, otherwise normal lung parenchyma and airways.  Pulmonary function testing today with mild airway obstruction (FEV1 84%), significant bronchodilator response, air-trapping and hyperinflation, and normal diffusion capacity.   Her chronic cough may be post-viral, OR underlying reactive airways vs asthma, due to noted bronchodilator response.  She did response to ICS previously.     Plan:  - reviewed PFT results and chest images in detail with patient today.   - start Symbicort (budesonide/formoterol) 160/4.5, two inhalations BID, rinse/gargle after use.    - she is UTD with annual influenza vaccine, COVID booster, RSV vaccine, and pneumococcal vaccines.  - if no improvement with inhaler,  order chest CT, could trial LAMA/LABA instead.   - Action plan: prednisone 40mg x5 days, + azithromycin x5 days.       Follow-up:  - one month    Sybil Guillaume CNP  Pulmonary Medicine  [facility]  [phone]       CC:     Cough, emphysema     HPI:     69 year old female with a history of HLD, osteoporosis - presenting for evaluation of emphysema and chronic cough.      Cough that 'never seems to go away'.   First noticed cough one year ago.  Started as an URI.    Ended up on an inhaler and steroids.   Fluticasone, did help cough some.  Used maybe a week or two.   Coughing in the morning, now more of it during the day.  Was dry at first, now productive.  Had cold a couple weeks ago, maybe phlegm from that.  Clear phlegm.   No wheezing until PFTs, heard wheezing during that.   When she walks dog and has to carry (15lbs), has some SOB with that.   No coughing at night.  Just wakes to go to bathroom, not waking up to cough or SOB.  No concerns with breathing when young.   No allergy symptoms.  No chronic sinus issues.   Smoking hx:  started at age 17, 1 PPD, quit 1996.        Medical records reviewed for this visit include PCP notes.     ROS:     A 12-system review was obtained and was negative with the exception of the symptoms endorsed in the HPI.       Medical history:       PMH:  Past Medical History:   Diagnosis Date     Carpal tunnel syndrome      Centrilobular emphysema (H)      Fibrocystic breast      Glaucoma        PSH:  Past Surgical History:   Procedure Laterality Date     GYN SURGERY      hysterectomy      HC REMOVAL OF TONSILS,<10 Y/O      Description: Tonsillectomy;  Recorded: 01/16/2009;     HYSTERECTOMY  1990     OOPHORECTOMY       FL LAP,DIAGNOSTIC ABDOMEN      Description: Laparoscopy (Diagnostic);  Recorded: 03/31/2011;     ZZC TOTAL ABDOM HYSTERECTOMY      Description: Hysterectomy;  Recorded: 01/20/2010;  Comments: vaginal       Allergies:  No Known  Allergies    Family Hx:  Family History   Problem Relation Age of Onset     Cancer Mother         skin     Cancer Father         parotid     Diabetes No family hx of      Coronary Artery Disease No family hx of      Ovarian Cancer Mother 65.00     Colon Cancer Father 88.00     Glaucoma Father      Heart Disease Paternal Grandfather      Diabetes Type 2  Maternal Uncle        Social Hx:  Social History     Socioeconomic History     Marital status:      Spouse name: Not on file     Number of children: Not on file     Years of education: Not on file     Highest education level: Not on file   Occupational History     Not on file   Tobacco Use     Smoking status: Former     Current packs/day: 0.00     Average packs/day: 1 pack/day for 24.0 years (24.0 ttl pk-yrs)     Types: Cigarettes     Start date:      Quit date:      Years since quittin.2     Passive exposure: Past     Smokeless tobacco: Never   Vaping Use     Vaping status: Never Used   Substance and Sexual Activity     Alcohol use: Yes     Alcohol/week: 5.8 standard drinks of alcohol     Drug use: No     Sexual activity: Yes     Partners: Male     Birth control/protection: Post-menopausal   Other Topics Concern     Not on file   Social History Narrative    ,  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      Social Drivers of Health     Financial Resource Strain: Not on File (2021)    Received from [facility]    Financial Resource Strain      Financial Resource Strain: 0   Food Insecurity: Not on File (2024)    Received from [facility]    Food Insecurity      Food: 0   Transportation Needs: Not on File (2021)    Received from [facility]    Transportation Needs      Transportation: 0   Physical Activity: Not on File (2021)    Received from [facility]    Physical Activity      Physical Activity: 0   Stress: Not on File (2021)    Received from [facility]    Stress      Stress: 0   Social Connections: Not on File  "(2024)    Received from [facility]    Social Connections      Connectedness: 0   Interpersonal Safety: Not on file   Housing Stability: Not on File (2021)    Received from [facility]    Housing Stability      Housin       Current Meds:  Current Outpatient Medications   Medication Sig Dispense Refill     alendronate (FOSAMAX) 70 MG tablet Take 1 tablet (70 mg) by mouth every 7 days 12 tablet 3     atorvastatin (LIPITOR) 10 MG tablet 1 tablet Orally Once a day*       budesonide-formoterol (SYMBICORT/BREYNA) 160-4.5 MCG/ACT Inhaler Inhale 2 puffs into the lungs 2 times daily. 10.2 g 5     calcium carbonate (OS-ARNIE) 600 mg (1,500 mg) tablet [CALCIUM CARBONATE (OS-ARNIE) 600 MG (1,500 MG) TABLET] Take 600 mg by mouth 2 (two) times a day with meals.       GEMTESA 75 MG TABS tablet Take 1 tablet every day by oral route.       latanoprost (XALATAN) 0.005 % ophthalmic solution [LATANOPROST (XALATAN) 0.005 % OPHTHALMIC SOLUTION]        multivitamin capsule [MULTIVITAMIN CAPSULE] Take 1 capsule by mouth daily.       FINACEA 15 % gel [FINACEA 15 % GEL]   11     triamcinolone (KENALOG) 0.025 % external ointment Apply topically 2 times daily 15 g 0          Physical Exam:     /72 (BP Location: Left arm, Patient Position: Sitting, Cuff Size: Adult Regular)   Pulse 66   Ht 1.621 m (5' 3.8\")   Wt 64.8 kg (142 lb 12.8 oz)   SpO2 97%   BMI 24.67 kg/m    Gen: adult female, appears in NAD  HEENT: clear conjunctivae, moist mucous membranes  CV: RRR, no M/G/R  Resp: CTAB, no focal crackles or wheezes.  Respirations even and unlabored.  On RA.   Skin: no apparent rashes on visible skin  Ext: no cyanosis, clubbing or edema  Neuro: alert and answering questions appropriately       Data:     Labs:  reviewed    Imaging studies:  I have personally reviewed all pertinent imaging studies and PFT results unless otherwise noted.    Recent Results (from the past 744 hours)   DX Bone Density    Narrative    EXAM: DX TBS " AXIAL  LOCATION: [facility]  DATE: 2/19/2025    INDICATION: Age-related osteoporosis with Fosamax use, , postmenopausal, height loss.  DEMOGRAPHICS: Age- 69 years. Gender- Female. Menopausal status- Postmenopausal.  COMPARISON: 2/6/2023  TECHNIQUE: Dual-energy x-ray absorptiometry (DXA) performed with routine technique.  Trabecular bone score (TBS) analysis performed.    FINDINGS:    DXA RESULTS  -Lumbar Spine: L1-L4: BMD: 0.917 g/cm2. T-score: -2.2. Z-score: -0.5. Degenerative change may artifactually increase BMD.  -RIGHT Hip Total: BMD: 0.849 g/cm2. T-score: -1.3. Z-score: 0.2.  -RIGHT Hip Femoral neck: BMD: 0.810 g/cm2. T-score: -1.6. Z-score: 0.1.  -LEFT Hip Total: BMD: 0.806 g/cm2. T-score: -1.6. Z-score: -0.1.  -LEFT Hip Femoral neck: BMD: 0.802 g/cm2. T-score: -1.7. Z-score: 0.0.    WHO T-SCORE CRITERIA  -Normal: T score at or above -1 SD  -Osteopenia: T score between -1 and -2.5 SD  -Osteoporosis: T score at or below -2.5 SD    The World Health Organization (WHO) criteria is applicable to perimenopausal females, postmenopausal females, and men aged 50 years or older.    TBS RESULTS  -Lumbar Spine L1-L4: TBS: 1.192. TBS T-score: -3.0.TBS Z-score: -0.7.    The TBS is a DXA derived measurement for fracture risk assessment, and reflects the structural condition of the bone microarchitecture. It can be used to adjust WHO Fracture Risk Assessment Tool (FRAX) probability of fracture in postmenopausal women and   older men. The calculated probabilities of fracture have been shown to be more accurate when computed with the TBS.    INTERVAL CHANGE  -There has been a 6.3% increase in lumbar spine BMD, which may be due to arthritic/sclerotic changes.  -There has been a 0.1% decrease in bilateral hip BMD.    FRACTURE RISK  -The FRAX risk calculator is not applicable due to medication that is used for treatment of osteopenia/osteoporosis or can otherwise affect bone mineral density.       Impression    IMPRESSION: Low bone density (OSTEOPENIA). T score meets the WHO criteria for low bone density (osteopenia) at one or more measured sites. The risk of osteoporotic fracture increases approximately two-fold for each standard deviation decrease in T-score.         Pulmonary Function Testing            Again, thank you for allowing me to participate in the care of your patient.        Sincerely,        Sybil Guillaume NP    Electronically signed

## 2025-03-19 NOTE — PATIENT INSTRUCTIONS
It was a pleasure to see you in clinic today.   Here is what we discussed:    Start Symbicort two puffs twice daily, rinse/gargle after use.   Call my nurse, Faizan ([phone]) with any change or worsening of your breathing.  Follow-up in one month.    Sybil Guillaume CNP  Pulmonary Medicine  [facility]  [phone]

## 2025-03-31 ENCOUNTER — LAB REQUISITION (OUTPATIENT)
Dept: LAB | Facility: CLINIC | Age: 70
End: 2025-03-31

## 2025-03-31 DIAGNOSIS — E78.2 MIXED HYPERLIPIDEMIA: ICD-10-CM

## 2025-03-31 DIAGNOSIS — Z01.84 ENCOUNTER FOR ANTIBODY RESPONSE EXAMINATION: ICD-10-CM

## 2025-03-31 PROCEDURE — 86765 RUBEOLA ANTIBODY: CPT | Performed by: FAMILY MEDICINE

## 2025-03-31 PROCEDURE — 82465 ASSAY BLD/SERUM CHOLESTEROL: CPT | Performed by: FAMILY MEDICINE

## 2025-03-31 PROCEDURE — 86735 MUMPS ANTIBODY: CPT | Performed by: FAMILY MEDICINE

## 2025-03-31 PROCEDURE — 86762 RUBELLA ANTIBODY: CPT | Performed by: FAMILY MEDICINE

## 2025-04-01 LAB
CHOLEST SERPL-MCNC: 188 MG/DL
FASTING STATUS PATIENT QL REPORTED: NORMAL
HDLC SERPL-MCNC: 82 MG/DL
LDLC SERPL CALC-MCNC: 95 MG/DL
MEV IGG SER IA-ACNC: >300 AU/ML
MEV IGG SER IA-ACNC: POSITIVE
MUMPS ANTIBODY IGG INSTRUMENT VALUE: >300 AU/ML
MUV IGG SER QL IA: POSITIVE
NONHDLC SERPL-MCNC: 106 MG/DL
RUBV IGG SERPL QL IA: >33 INDEX
RUBV IGG SERPL QL IA: POSITIVE
TRIGL SERPL-MCNC: 56 MG/DL

## 2025-05-01 ENCOUNTER — OFFICE VISIT (OUTPATIENT)
Dept: PULMONOLOGY | Facility: CLINIC | Age: 70
End: 2025-05-01
Attending: NURSE PRACTITIONER
Payer: COMMERCIAL

## 2025-05-01 VITALS
DIASTOLIC BLOOD PRESSURE: 72 MMHG | WEIGHT: 140 LBS | OXYGEN SATURATION: 95 % | HEART RATE: 66 BPM | SYSTOLIC BLOOD PRESSURE: 124 MMHG | BODY MASS INDEX: 24.18 KG/M2

## 2025-05-01 DIAGNOSIS — J45.40 RAD (REACTIVE AIRWAY DISEASE), MODERATE PERSISTENT, UNCOMPLICATED: Primary | ICD-10-CM

## 2025-05-01 DIAGNOSIS — R05.3 CHRONIC COUGH: ICD-10-CM

## 2025-05-01 DIAGNOSIS — J43.2 CENTRILOBULAR EMPHYSEMA (H): ICD-10-CM

## 2025-05-01 NOTE — PATIENT INSTRUCTIONS
It was a pleasure to see you in clinic today.   Here is what we discussed:    Continue Symbicort two puffs twice daily, rinse/gargle after use.  You can use this as needed as well, max 12 puffs/day.   Call my nurse, Faizan ([phone]) with any change or worsening of your breathing.  Follow-up in 6 months.     Sybil Guillaume, CNP  Pulmonary Medicine  [facility]  [phone]

## 2025-05-01 NOTE — PROGRESS NOTES
Pulmonary Clinic Follow-up          Assessment/Plan:     70 year old female with a history of HLD, osteoporosis - presenting for one month follow-up of emphysema and chronic cough.      Moderate persistent reactive airways  Chronic cough  Emphysema  Nicotine dependence, in remission  Former smoker with 24 pack year history, quit in 1996.  She presented 3/2025 for evaluation of chronic cough for the past year, and also emphysema incidentally noted on cardiac CT.  Her cough started about one year ago when she had an URI.  She required prednisone + fluticasone inhaled to decrease the cough, but it never really went away.  No respiratory issues prior.  She does endorse some shortness of breath when she is on a walk and has to carry her daughters dog, otherwise none.  She did note wheezing during PFTs today, otherwise none prior.  Some mucous production, but it is clear.  She denies chronic sinus concerns or seasonal/environmental allergies.  No personal history or family history of asthma.  Cardiac CT in 12/2024 with mild centrilobular emphysema, otherwise normal lung parenchyma and airways.  Pulmonary function testing today with mild airway obstruction (FEV1 84%), significant bronchodilator response, air-trapping and hyperinflation, and normal diffusion capacity.   Her chronic cough may be post-viral, OR underlying reactive airways vs asthma, due to noted bronchodilator response.  She did response to ICS previously.   Last visit we started Symbicort - she notes improvement in symptoms since that time.  She has more energy, cough is decreased, and she can take more complete breaths.     Plan:  - continue Symbicort (budesonide/formoterol) 160/4.5, two inhalations BID, rinse/gargle after use.   She could trial decreasing this or using PRN in future, we discussed this briefly.  - she is UTD with annual influenza vaccine, COVID booster, RSV vaccine, and pneumococcal vaccines.  - if no improvement with inhaler, order chest  CT, could trial LAMA/LABA instead.   - Action plan: prednisone 40mg x5 days, + azithromycin x5 days.       Follow-up:  - 6 months    Sybil Guillaume CNP  Pulmonary Medicine  [facility]  [phone]       CC:     Cough, emphysema - follow-up     HPI:     70 year old female with a history of HLD, osteoporosis - presenting for one month follow-up of emphysema and chronic cough.      Breathing improved.  More energy.  Before couldn't take a full breath, now she can.   Does have recent illness due to sick grandkids.       Previous HPI:  Cough that 'never seems to go away'.   First noticed cough one year ago.  Started as an URI.    Ended up on an inhaler and steroids.   Fluticasone, did help cough some.  Used maybe a week or two.   Coughing in the morning, now more of it during the day.  Was dry at first, now productive.  Had cold a couple weeks ago, maybe phlegm from that.  Clear phlegm.   No wheezing until PFTs, heard wheezing during that.   When she walks dog and has to carry (15lbs), has some SOB with that.   No coughing at night.  Just wakes to go to bathroom, not waking up to cough or SOB.  No concerns with breathing when young.   No allergy symptoms.  No chronic sinus issues.   Smoking hx:  started at age 17, 1 PPD, quit 1996.           ROS:     A 4-system review was obtained and was negative with the exception of the symptoms endorsed in the HPI.       Medical history:       PMH:  Past Medical History:   Diagnosis Date    Carpal tunnel syndrome     Centrilobular emphysema (H)     Fibrocystic breast     Glaucoma        PSH:  Past Surgical History:   Procedure Laterality Date    GYN SURGERY      hysterectomy     HC REMOVAL OF TONSILS,<10 Y/O      Description: Tonsillectomy;  Recorded: 01/16/2009;    HYSTERECTOMY  1990    OOPHORECTOMY      [place] LAP,DIAGNOSTIC ABDOMEN      Description: Laparoscopy (Diagnostic);  Recorded: 03/31/2011;    C TOTAL ABDOM HYSTERECTOMY       Description: Hysterectomy;  Recorded: 2010;  Comments: vaginal       Allergies:  No Known Allergies    Family Hx:  Family History   Problem Relation Age of Onset    Cancer Mother         skin    Cancer Father         parotid    Diabetes No family hx of     Coronary Artery Disease No family hx of     Ovarian Cancer Mother 65.00    Colon Cancer Father 88.00    Glaucoma Father     Heart Disease Paternal Grandfather     Diabetes Type 2  Maternal Uncle        Social Hx:  Social History     Socioeconomic History    Marital status:      Spouse name: Not on file    Number of children: Not on file    Years of education: Not on file    Highest education level: Not on file   Occupational History    Not on file   Tobacco Use    Smoking status: Former     Current packs/day: 0.00     Average packs/day: 1 pack/day for 24.0 years (24.0 ttl pk-yrs)     Types: Cigarettes     Start date:      Quit date:      Years since quittin.3     Passive exposure: Past    Smokeless tobacco: Never   Vaping Use    Vaping status: Never Used   Substance and Sexual Activity    Alcohol use: Yes     Alcohol/week: 5.8 standard drinks of alcohol    Drug use: No    Sexual activity: Yes     Partners: Male     Birth control/protection: Post-menopausal   Other Topics Concern    Not on file   Social History Narrative    ,  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      Social Drivers of Health     Financial Resource Strain: Not on File (2021)    Received from [facility]    Financial Resource Strain     Financial Resource Strain: 0   Food Insecurity: Not on File (2024)    Received from [employer]    Food Insecurity     Food: 0   Transportation Needs: Not on File (2021)    Received from [facility]    Transportation Needs     Transportation: 0   Physical Activity: Not on File (2021)    Received from [facility]    Physical Activity     Physical Activity: 0   Stress: Not on File (2021)    Received from  [facility]    Stress     Stress: 0   Social Connections: Not on File (2024)    Received from [facility]    Social Connections     Connectedness: 0   Interpersonal Safety: Not on file   Housing Stability: Not on File (2021)    Received from [facility]    Housing Stability     Housin       Current Meds:  Current Outpatient Medications   Medication Sig Dispense Refill    atorvastatin (LIPITOR) 10 MG tablet 1 tablet Orally Once a day*      budesonide-formoterol (SYMBICORT/BREYNA) 160-4.5 MCG/ACT Inhaler Inhale 2 puffs into the lungs 2 times daily. 10.2 g 5    calcium carbonate (OS-ARNIE) 600 mg (1,500 mg) tablet [CALCIUM CARBONATE (OS-ARNIE) 600 MG (1,500 MG) TABLET] Take 600 mg by mouth 2 (two) times a day with meals.      GEMTESA 75 MG TABS tablet Take 1 tablet every day by oral route.      latanoprost (XALATAN) 0.005 % ophthalmic solution [LATANOPROST (XALATAN) 0.005 % OPHTHALMIC SOLUTION]       multivitamin capsule [MULTIVITAMIN CAPSULE] Take 1 capsule by mouth daily.      alendronate (FOSAMAX) 70 MG tablet Take 1 tablet (70 mg) by mouth every 7 days 12 tablet 3    FINACEA 15 % gel [FINACEA 15 % GEL]   11    triamcinolone (KENALOG) 0.025 % external ointment Apply topically 2 times daily 15 g 0          Physical Exam:     /72   Pulse 66   Wt 63.5 kg (140 lb)   SpO2 95%   BMI 24.18 kg/m    Gen: adult female, appears in NAD  HEENT: clear conjunctivae, moist mucous membranes  Resp: CTAB, no focal crackles or wheezes.  Respirations even and unlabored.  On RA.  No cough.   Skin: no apparent rashes on visible skin  Ext: no cyanosis, clubbing or edema  Neuro: alert and answering questions appropriately       Data:     Labs:  reviewed    Imaging studies:  I have personally reviewed all pertinent imaging studies and PFT results unless otherwise noted.    No results found for this or any previous visit (from the past 744 hours).        Pulmonary Function Testing      3/2025:  IMPRESSION:   Mild obstructive  physiology is present with a significant bronchodilator response.   Air trapping and hyperinflation are present.   Corrected diffusing capacity is normal.

## 2025-05-02 PROBLEM — I25.10 CORONARY ARTERY DISEASE INVOLVING NATIVE CORONARY ARTERY OF NATIVE HEART WITHOUT ANGINA PECTORIS: Status: ACTIVE | Noted: 2025-05-02

## 2025-05-02 PROBLEM — E78.5 DYSLIPIDEMIA, GOAL LDL BELOW 70: Status: ACTIVE | Noted: 2025-05-02

## 2025-05-29 RX ORDER — METOPROLOL TARTRATE 1 MG/ML
5-20 INJECTION, SOLUTION INTRAVENOUS
Status: ACTIVE | OUTPATIENT
Start: 2025-05-29

## 2025-05-29 RX ORDER — NITROGLYCERIN 0.4 MG/1
0.4 TABLET SUBLINGUAL
Status: ACTIVE | OUTPATIENT
Start: 2025-05-29

## 2025-06-12 ENCOUNTER — RESULTS FOLLOW-UP (OUTPATIENT)
Dept: CARDIOLOGY | Facility: CLINIC | Age: 70
End: 2025-06-12

## 2025-06-12 ENCOUNTER — HOSPITAL ENCOUNTER (OUTPATIENT)
Dept: CT IMAGING | Facility: CLINIC | Age: 70
End: 2025-06-12
Attending: INTERNAL MEDICINE
Payer: COMMERCIAL

## 2025-06-12 VITALS
BODY MASS INDEX: 24.24 KG/M2 | WEIGHT: 142 LBS | HEIGHT: 64 IN | DIASTOLIC BLOOD PRESSURE: 69 MMHG | SYSTOLIC BLOOD PRESSURE: 128 MMHG

## 2025-06-12 DIAGNOSIS — I25.10 CORONARY ARTERY DISEASE INVOLVING NATIVE CORONARY ARTERY OF NATIVE HEART WITHOUT ANGINA PECTORIS: ICD-10-CM

## 2025-06-12 PROCEDURE — 250N000011 HC RX IP 250 OP 636: Performed by: INTERNAL MEDICINE

## 2025-06-12 PROCEDURE — 75574 CT ANGIO HRT W/3D IMAGE: CPT

## 2025-06-12 PROCEDURE — 250N000013 HC RX MED GY IP 250 OP 250 PS 637: Performed by: INTERNAL MEDICINE

## 2025-06-12 RX ORDER — IOPAMIDOL 755 MG/ML
100 INJECTION, SOLUTION INTRAVASCULAR ONCE
Status: COMPLETED | OUTPATIENT
Start: 2025-06-12 | End: 2025-06-12

## 2025-06-12 RX ADMIN — NITROGLYCERIN 0.4 MG: 0.4 TABLET SUBLINGUAL at 08:12

## 2025-06-12 RX ADMIN — IOPAMIDOL 100 ML: 755 INJECTION, SOLUTION INTRAVENOUS at 08:21

## 2025-07-09 ENCOUNTER — LAB (OUTPATIENT)
Dept: CARDIOLOGY | Facility: CLINIC | Age: 70
End: 2025-07-09
Payer: COMMERCIAL

## 2025-07-09 DIAGNOSIS — E78.5 DYSLIPIDEMIA, GOAL LDL BELOW 70: ICD-10-CM

## 2025-07-09 LAB
ALT SERPL W P-5'-P-CCNC: 24 U/L (ref 0–50)
CHOLEST SERPL-MCNC: 171 MG/DL
FASTING STATUS PATIENT QL REPORTED: YES
HDLC SERPL-MCNC: 104 MG/DL
LDLC SERPL CALC-MCNC: 57 MG/DL
NONHDLC SERPL-MCNC: 67 MG/DL
TRIGL SERPL-MCNC: 52 MG/DL

## 2025-07-09 PROCEDURE — 80061 LIPID PANEL: CPT

## 2025-07-09 PROCEDURE — 36415 COLL VENOUS BLD VENIPUNCTURE: CPT

## 2025-07-09 PROCEDURE — 3048F LDL-C <100 MG/DL: CPT

## 2025-07-09 PROCEDURE — 84460 ALANINE AMINO (ALT) (SGPT): CPT

## 2025-08-06 ENCOUNTER — TRANSFERRED RECORDS (OUTPATIENT)
Dept: HEALTH INFORMATION MANAGEMENT | Facility: CLINIC | Age: 70
End: 2025-08-06
Payer: COMMERCIAL